# Patient Record
Sex: FEMALE | Race: BLACK OR AFRICAN AMERICAN | Employment: FULL TIME | URBAN - METROPOLITAN AREA
[De-identification: names, ages, dates, MRNs, and addresses within clinical notes are randomized per-mention and may not be internally consistent; named-entity substitution may affect disease eponyms.]

---

## 2017-03-24 ENCOUNTER — HOSPITAL ENCOUNTER (EMERGENCY)
Age: 52
Discharge: HOME | End: 2017-03-24
Attending: EMERGENCY MEDICINE
Payer: COMMERCIAL

## 2017-03-24 ENCOUNTER — APPOINTMENT (OUTPATIENT)
Dept: GENERAL RADIOLOGY | Age: 52
End: 2017-03-24
Attending: NURSE PRACTITIONER
Payer: COMMERCIAL

## 2017-03-24 VITALS
SYSTOLIC BLOOD PRESSURE: 133 MMHG | OXYGEN SATURATION: 99 % | HEIGHT: 58 IN | RESPIRATION RATE: 18 BRPM | BODY MASS INDEX: 23.09 KG/M2 | WEIGHT: 110 LBS | DIASTOLIC BLOOD PRESSURE: 85 MMHG | HEART RATE: 78 BPM
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
## 2017-03-24 DIAGNOSIS — S20.219A STERNAL CONTUSION, INITIAL ENCOUNTER: ICD-10-CM

## 2017-03-24 DIAGNOSIS — S70.11XA CONTUSION OF RIGHT THIGH, INITIAL ENCOUNTER: ICD-10-CM

## 2017-03-24 DIAGNOSIS — S60.221A CONTUSION OF RIGHT HAND, INITIAL ENCOUNTER: ICD-10-CM

## 2017-03-24 PROCEDURE — 99283 EMERGENCY DEPT VISIT LOW MDM: CPT | Performed by: NURSE PRACTITIONER

## 2017-03-24 PROCEDURE — 73552 X-RAY EXAM OF FEMUR 2/>: CPT

## 2017-03-24 PROCEDURE — 71120 X-RAY EXAM BREASTBONE 2/>VWS: CPT

## 2017-03-24 PROCEDURE — 74011250637 HC RX REV CODE- 250/637: Performed by: NURSE PRACTITIONER

## 2017-03-24 RX ORDER — TRAMADOL HYDROCHLORIDE 50 MG/1
50 TABLET ORAL
Status: COMPLETED | OUTPATIENT
Start: 2017-03-24 | End: 2017-03-24

## 2017-03-24 RX ORDER — TRAMADOL HYDROCHLORIDE 50 MG/1
50 TABLET ORAL
Qty: 12 TAB | Refills: 0 | Status: SHIPPED | OUTPATIENT
Start: 2017-03-24 | End: 2017-03-27

## 2017-03-24 RX ADMIN — TRAMADOL HYDROCHLORIDE 50 MG: 50 TABLET, FILM COATED ORAL at 13:55
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
## 2018-12-12 ENCOUNTER — HOSPITAL ENCOUNTER (OUTPATIENT)
Dept: PHYSICAL THERAPY | Age: 53
Discharge: HOME | End: 2018-12-12
Payer: COMMERCIAL

## 2018-12-12 PROCEDURE — 97018 PARAFFIN BATH THERAPY: CPT

## 2018-12-12 PROCEDURE — 97110 THERAPEUTIC EXERCISES: CPT

## 2018-12-12 PROCEDURE — 97165 OT EVAL LOW COMPLEX 30 MIN: CPT

## 2018-12-12 PROCEDURE — 97140 MANUAL THERAPY 1/> REGIONS: CPT

## 2018-12-12 NOTE — THERAPY EVALUATION
Lucía Hamilton  : 1965  Primary: [facility]  [facility]:  [facility] [address], [place], [facility]  [phone]   [phone]         OUTPATIENT OCCUPATIONAL THERAPY: Initial Assessment and Treatment Day: 2018    ICD-10: Treatment Diagnosis: Pain in right wrist (M25.531); Pain in right arm (M79.601); Stiffness of right hand, not elsewhere classified (M25.641); Stiffness of right wrist, not elsewhere classified (M25.631)  Precautions/Allergies:   Patient has no known allergies. Avoid pushing, pulling. No lifting over 2 lbs. MD Orders: Progressive return to work program (progressive hand/UE strengthening and simulating work activities where possible) starting about 14 days post-op. OT 2 X/week X 8 weeks post-op. Instruct patient regarding skin care, scar massage, bathing tips and hourly home active/passive ROM program to maintain/regain finger, thumb wrist, FA, elbow and shoulder function. Encourage light home activities ASAP, and all activities as symptoms as well. Silicone scar treatment prn. MEDICAL/REFERRING DIAGNOSIS:   Carpal tunnel syndrome, right upper limb [G56.01]   DATE OF ONSET:  initial symptoms; s/p surgery  and 2018. REFERRING PHYSICIAN: Hosie Felty, MD  RETURN PHYSICIAN APPOINTMENT: 19     INITIAL ASSESSMENT:  Ms. Ryan Foote presents s/p R carpal tunnel release 18 with decreased functional use, strength, sensation, and range of motion of her right hand, wrist and upper extremity that is affecting her independence with activities of daily living and ability to perform job tasks. I feel that Ms. White will benefit from skilled occupational therapy to maximize the functional use of her upper extremity in daily activities and work tasks. PLAN OF CARE:   PROBLEM LIST:  1. Pain in R wrist/hand.   2. Tingling in R wrist/hand primarily RF/LF and occasionally proximally to elbow.  3. Decreased motion in R wrist/hand/upper extremity. 4. Decreased strength in R wrist/hand/upper extremity. INTERVENTIONS PLANNED:  1. Modalities that may include silicone gel sheets, paraffin, and ultrasound  2. Therapeutic exercise including a home exercise program.  3. Manual therapy. 4. Therapeutic activities. TREATMENT PLAN:  Effective Dates/Frequency/Duration: Twice per week from 12/12/2018 till 2/12/2019 (90 Days). .  GOALS: (Goals have been discussed and agreed upon with patient.)  Short-Term Functional Goals: Time Frame: 4 weeks  1. Decrease pain to 4 to allow patient to perform self care tasks. 2. Increase motion in R finger flexion tips to palm with straight fist to improve functional use of upper extremity in ADL activities. 3. Fold a bandana to simulate work tasks without difficulty to allow patient to  and lift objects during work tasks. 4. Report moderate difficulty or less for carrying a shopping bag via Quick-DASH. Discharge Goals: Time Frame: 8 weeks  1. Decrease pain to 2 to allow patient to perform all household and work tasks. 2. Increase motion in R wrist extension/flexion by 10 degrees to allow patient to perform all ADL activities. 3. Increase strength in R  by 15 pounds to allow patient to , lift, hold, and carry heavy objects. 5. Report moderate or less tingling via Quick-DASH. Rehabilitation Potential For Stated Goals: Good  Regarding [de-identified] M Cindy's therapy, I certify that the treatment plan above will be carried out by a therapist or under their direction. Thank you for this referral,  Bradley Gallagher OTR/L       Referring Physician Signature: Miladis Juarez MD _________________________  Date _________            The information in this section was collected on 12/12/18 (except where otherwise noted).    OCCUPATIONAL PROFILE & HISTORY:   History of Present Injury/Illness (Reason for Referral):  Lori Otero is a  who is s/p R carpal tunnel release on 18 by referring physician. She reports persistent numbness and stiffness in her ring and middle fingers which began about a year ago which is worse when at sleep and when she is gripping. She reports she is having tingling going up to her elbow as well a couple of times a day more when she is laying down. States she is not sleeping very well. States she has a history of R carpal tunnel syndrome in  after a pregnancy. States she is having diffculty opening up things. States she hasn't been doing a lot of cooking because of the pain in her R hand. She states she is keeping her hand over her head, because the doctor told her to keep it elevated. She is wearing a wrist cock up brace on the R at all times. 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 She has a history of trigger thumb with release done on the R in . History of R carpal tunnel and recently left thumb flexor injection. History of L carpal tunnel release (still wears a wrist cock up brace on the L at night) and surgery on radial wrist because \"my wrist kept locking up. \"    Ms. White  has a past medical history of Chronic groin pain, Environmental allergies, Incisional pain, and Sebaceous cyst.  Ms. Nico Mcneal  has a past surgical history that includes hx breast reduction (Bilateral); hx  section; hx myomectomy; hx tonsillectomy; hx dilation and curettage; hx hysterectomy (12); hx bunionectomy (Bilateral); EXPLORATION AND REVISION OF PFANNENSTIEL INCISION (Left, 2014); LAPAROSCOPY GYN DIAGNOSTIC (N/A, 2014); EXCISION OF SEBACEOUS CYST LEFT BUTTOCK (Left, 2014); RIGHT THUMB TRIGGER RELEASE (Right, 12/10/2013); HAND CARPAL TUNNEL RELEASE LEFT (Left, 12/10/2013); DEQUERVAINS RELEASE LEFT (Left, 12/10/2013); and HYSTERECTOMY ABDOMINAL TOTAL WITH SALPINGO OOPHORECTOMY (Bilateral, 2012).   Social History/Living Environment:    Denies smoking. Denies any   Prior Level of Function/Work/Activity:  Lives with son in apartment. 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 \"  Does not   Dominant Side:         RIGHT  Previous Treatment Approaches:          No therapy. Current Medications:    Current Outpatient Medications: per intake form.   fexofenadine (ALLEGRA) 180 mg tablet, Take 180 mg by mouth daily. Date Last Reviewed:  2018    Number of medical conditions (excluding presenting problem) that affect the Plan of Care: Expanded review of therapy/medical records (1-2):  MODERATE COMPLEXITY   ASSESSMENT OF OCCUPATIONAL PERFORMANCE:   RANGE OF MOTION:     · AROM:   R:  Wrist extension: 62°  Wrist flexion: 50°  Finger tips ~1-2 cm from palm with straight fist.   Shoulder ER 80-90° shoulder abducted to 90° (complains of pain with this).   L:   Wrist extension: 69°  Wrist flexion: 65°  Shoulder ER 80-90° shoulder abducted to 90°  · PROM:   R:  Wrist extension: 69° (painful)  Wrist flexion: 52° (painful)  STRENGTH:     R:   Schafer abduction: 4+/5  Opposition: 4+/5  Thumb adduction: 4/5  FPL: 4+/5  Thumb MP flexion: 4+/5  Digit adduction: IF/SF: 4-/5; RF: 4/5; LF: 4+/5  Digit abduction: IF/SF: 4+/5; RF: 4-/5  Shoulder flexion: 4+/5  Shoulder ER: 4/5  Elbow flexion: 5/5  Elbow extension: 5/5  L:   Schafer abduction: 4/5  Opposition: 4/5  Thumb adduction: 4/5  FPL: 4+/5  Thumb MP flexion: 4+/5  Digit adduction: IF/SF: 4/5; RF: 4+/5; LF: 4+/5  Digit abduction: IF/SF: 4+/5; RF: 4+/5; LF: 4+/5  Shoulder flexion: 4+/5  Shoulder ER: 4+/5  Elbow flexion: 5/5  Elbow extension: 5/5   Strength (Dynamometer)      LUE 1st run lbs 2nd run lbs; 3rd run lbs; 4th run 5th rung: 15 lbs     RUE 1st run lbs 2nd run lbs; 3rd run lbs; 4th run lbs 5th run lbs     SENSATION: Trona-Miri Monofilament (D=Dorsally; V=Volarly):  2.83: D: R: Senses over thumb P1/P2. Unable to sense anywhere else. V: Senses 25% of the time over RF/LF P1-3. Able to sense L/R hands elsewhere.; L: 100% excluding thumb P1/P2;   3.61: D: R: Unable to sense RF P1-3 75% of the time. Unable to sense LF P1-3 30-50% of the time. Able to sense elsewhere R/L hands. V: R/L 100%. 4.31: 100%  4.56:100%  6.65: 100%  EDEMA:  No significant swelling noted. OBSERVATION/PALPATION:  Tenderness (most tender radial proximal portion) over volar scar/incision along proximal thenar crease. Reports tingling RF/LF at times during scar massage. SPECIAL TESTS:  Krzysztof Mojica: -; Tinel's: over carpal tunnel: -; over cubital tunnel: +; Mode pick-up R/L: 11/10 seconds; eyes occluded 20 seconds. Upper limb tension test: RUE positive for median nerve     Physical Skills Involved:  1. Range of Motion  2. Strength  3. Activity Tolerance  4. Sensation  5. Skin Integrity Cognitive Skills Affected (resulting in the inability to perform in a timely and safe manner):  1. None noted Psychosocial Skills Affected:  1. Habits/Routines  2. Environmental Adaptation  3. Social Interaction  4. Social Roles   Number of elements that affect the Plan of Care: 1-3:  LOW COMPLEXITY   CLINICAL DECISION MAKING:   Outcome Measure: Tool Used: Disabilities of the Arm, Shoulder and Hand (DASH) Questionnaire - Quick Version  Score:  Initial: 39/55 (ommitted #6) Most Recent: X/55 (Date: -- )   Interpretation of Score: The DASH is designed to measure the activities of daily living in person's with upper extremity dysfunction or pain. Each section is scored on a 1-5 scale, 5 representing the greatest disability. The scores of each section are added together for a total score of 55. Medical Necessity:   · Patient demonstrates good rehab potential due to higher previous functional level.   Reason for Services/Other Comments:  · Patient continues to require skilled intervention due to decreased independence with ADL, instrumental ADL and work tasks. Clinical Decision-Making Assessment:  Teressa White required none to minimal verbal, visual, physical or environmental cues to carry out assessment tasks. Clinical Decision-Making: LOW COMPLEXITY   TREATMENT:   (In addition to Assessment/Re-Assessment sessions the following treatments were rendered)  Pre-treatment Symptoms/Complaints:  Numbness/tingling R RF/LF. Tenderness over incision. Pain: Initial:   Pain Intensity 1: 6  Pain Location 1: Hand  Pain Orientation 1: Right  Post Session:  same   Assessment: 45 minutes  In addition to assessment the below treatment deemed medically necessary. MODALITIES: (5-10 minutes): *  ParaffinTherapy in order to provide analgesia and reduce stiffness R hand/wrist.   Therapeutic Exercise: (  8 minutes):  Exercises per grid below to improve mobility, strength, coordination and dynamic movement of hand - right and wrist - right to improve functional reaching and grasping/releasing. Required moderate visual, verbal, manual and tactile cues to promote proper body alignment, promote proper body posture and promote proper body mechanics. Progressed resistance, range, repetitions and complexity of movement as indicated. Date:  12/12 Date:   Date:     Activity/Exercise Parameters Parameters Parameters   Tendon glides 3 sets 5-10 reps (pain in R shoulder with straight fist)           Manual Therapy: (15 minutes): Soft tissue mobilization was utilized and necessary because of the patient's restricted joint motion and restricted motion of soft tissue. Gentle longitudinal and horizontal scar massage completed with scar massage tool (rubber tip). Patient reported tingling in LF/RF at times. Home program: As above. Treatment/Session Assessment:    · Response to Treatment:  Patients tolerated treatment Well with no medical complications.   Upon completion of treatment, skin condition was intact/without erythema. .  · Compliance with Program/Exercises: compliant today. · Recommendations/Intent for next treatment session: \"Next visit will focus on advancements to more challenging activities\".   Total Treatment Duration:  OT Patient Time In/Time Out  Time In: 1030  Time Out: 100 [facility] MARK Santana, OTR/L

## 2018-12-14 ENCOUNTER — HOSPITAL ENCOUNTER (OUTPATIENT)
Dept: PHYSICAL THERAPY | Age: 53
Discharge: HOME | End: 2018-12-14
Payer: COMMERCIAL

## 2018-12-14 PROCEDURE — 97140 MANUAL THERAPY 1/> REGIONS: CPT

## 2018-12-14 PROCEDURE — 97110 THERAPEUTIC EXERCISES: CPT

## 2018-12-14 PROCEDURE — 97018 PARAFFIN BATH THERAPY: CPT

## 2018-12-14 NOTE — THERAPY EVALUATION
Oumou Garcia  : 1965  Primary: [employer] [facility] [employer] [facility]  Secondary:  [address]  at [facility]  [address], [address], [place] [address] [address] [facility]  [phone]   [phone]         OUTPATIENT OCCUPATIONAL THERAPY: Treatment Day: 2018    ICD-10: Treatment Diagnosis: Pain in right wrist (M25.531); Pain in right arm (M79.601); Stiffness of right hand, not elsewhere classified (M25.641); Stiffness of right wrist, not elsewhere classified (M25.631)  Precautions/Allergies:   Patient has no known allergies. Avoid pushing, pulling. No lifting over 2 lbs. MD Orders: Progressive return to work program (progressive hand/UE strengthening and simulating work activities where possible) starting about 14 days post-op. OT 2 X/week X 8 weeks post-op. Instruct patient regarding skin care, scar massage, bathing tips and hourly home active/passive ROM program to maintain/regain finger, thumb wrist, FA, elbow and shoulder function. Encourage light home activities ASAP, and all activities as symptoms as well. Silicone scar treatment prn. MEDICAL/REFERRING DIAGNOSIS:   Carpal tunnel syndrome, right upper limb [G56.01]   DATE OF ONSET:  initial symptoms; s/p surgery  and 2018. REFERRING PHYSICIAN: Patito Ware MD  RETURN PHYSICIAN APPOINTMENT: 19     INITIAL ASSESSMENT:  Ms. Major Kaminski presents s/p R carpal tunnel release 18 with decreased functional use, strength, sensation, and range of motion of her right hand, wrist and upper extremity that is affecting her independence with activities of daily living and ability to perform job tasks. I feel that Ms. White will benefit from skilled occupational therapy to maximize the functional use of her upper extremity in daily activities and work tasks. PLAN OF CARE:   PROBLEM LIST:  1. Pain in R wrist/hand. 2. Tingling in R wrist/hand primarily RF/LF and occasionally proximally to elbow.   3. Decreased motion in R wrist/hand/upper extremity. 4. Decreased strength in R wrist/hand/upper extremity. INTERVENTIONS PLANNED:  1. Modalities that may include silicone gel sheets, paraffin, and ultrasound  2. Therapeutic exercise including a home exercise program.  3. Manual therapy. 4. Therapeutic activities. TREATMENT PLAN:  Effective Dates/Frequency/Duration: Twice per week from 12/12/2018 till 2/12/2019 (90 Days). .  GOALS: (Goals have been discussed and agreed upon with patient.)  Short-Term Functional Goals: Time Frame: 4 weeks  1. Decrease pain to 4 to allow patient to perform self care tasks. 2. Increase motion in R finger flexion tips to palm with straight fist to improve functional use of upper extremity in ADL activities. 3. Fold a bandana to simulate work tasks without difficulty to allow patient to  and lift objects during work tasks. 4. Report moderate difficulty or less for carrying a shopping bag via Quick-DASH. Discharge Goals: Time Frame: 8 weeks  1. Decrease pain to 2 to allow patient to perform all household and work tasks. 2. Increase motion in R wrist extension/flexion by 10 degrees to allow patient to perform all ADL activities. 3. Increase strength in R  by 15 pounds to allow patient to , lift, hold, and carry heavy objects. 5. Report moderate or less tingling via Quick-DASH. Rehabilitation Potential For Stated Goals: Good  Thank you for this referral,  MARK Dickinson, OTR/L                 The information in this section was collected on 12/12/18 (except where otherwise noted). OCCUPATIONAL PROFILE & HISTORY:   History of Present Injury/Illness (Reason for Referral):  Apryl Medina is a  who is s/p R carpal tunnel release on 11/13/18 by referring physician. She reports persistent numbness and stiffness in her ring and middle fingers which began about a year ago which is worse when at sleep and when she is gripping.   She reports she is having tingling going up to her elbow as well a couple of times a day more when she is laying down. States she is not sleeping very well. States she has a history of R carpal tunnel syndrome in  after a pregnancy. States she is having diffculty opening up things. States she hasn't been doing a lot of cooking because of the pain in her R hand. She states she is keeping her hand over her head, because the doctor told her to keep it elevated. She is wearing a wrist cock up brace on the R at all times. 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 She has a history of trigger thumb with release done on the R in . History of R carpal tunnel and recently left thumb flexor injection. History of L carpal tunnel release (still wears a wrist cock up brace on the L at night) and surgery on radial wrist because \"my wrist kept locking up. \"    Ms. White  has a past medical history of Chronic groin pain, Environmental allergies, Incisional pain, and Sebaceous cyst.  Ms. Job Uriostegui  has a past surgical history that includes hx breast reduction (Bilateral); hx  section; hx myomectomy; hx tonsillectomy; hx dilation and curettage; hx hysterectomy (12); hx bunionectomy (Bilateral); EXPLORATION AND REVISION OF PFANNENSTIEL INCISION (Left, 2014); LAPAROSCOPY GYN DIAGNOSTIC (N/A, 2014); EXCISION OF SEBACEOUS CYST LEFT BUTTOCK (Left, 2014); RIGHT THUMB TRIGGER RELEASE (Right, 12/10/2013); HAND CARPAL TUNNEL RELEASE LEFT (Left, 12/10/2013); DEQUERVAINS RELEASE LEFT (Left, 12/10/2013); and HYSTERECTOMY ABDOMINAL TOTAL WITH SALPINGO OOPHORECTOMY (Bilateral, 2012). Social History/Living Environment:    Denies smoking. Denies any   Prior Level of Function/Work/Activity:  Lives with son in apartment. 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 \"  Does not   Dominant Side:         RIGHT  Previous Treatment Approaches:          No therapy. Current Medications:    Current Outpatient Medications: per intake form.   fexofenadine (ALLEGRA) 180 mg tablet, Take 180 mg by mouth daily. Date Last Reviewed:  2018    Number of medical conditions (excluding presenting problem) that affect the Plan of Care: Expanded review of therapy/medical records (1-2):  MODERATE COMPLEXITY   ASSESSMENT OF OCCUPATIONAL PERFORMANCE:   RANGE OF MOTION:     · AROM:   R:  Wrist extension: 62°  Wrist flexion: 50°  Finger tips ~1-2 cm from palm with straight fist.   Shoulder ER 80-90° shoulder abducted to 90° (complains of pain with this). L:   Wrist extension: 69°  Wrist flexion: 65°  Shoulder ER 80-90° shoulder abducted to 90°  · PROM:   R:  Wrist extension: 69° (painful)  Wrist flexion: 52° (painful)  STRENGTH:     R:   Schafer abduction: 4+/5  Opposition: 4+/5  Thumb adduction: 4/5  FPL: 4+/5  Thumb MP flexion: 4+/5  Digit adduction: IF/SF: 4-/5; RF: 4/5; LF: 4+/5  Digit abduction: IF/SF: 4+/5; RF: 4-/5  Shoulder flexion: 4+/5  Shoulder ER: 4/5  Elbow flexion: 5/5  Elbow extension: 5/5  L:   Schafer abduction: 4/5  Opposition: 4/5  Thumb adduction: 4/5  FPL: 4+/5  Thumb MP flexion: 4+/5  Digit adduction: IF/SF: 4/5; RF: 4+/5; LF: 4+/5  Digit abduction: IF/SF: 4+/5; RF: 4+/5; LF: 4+/5  Shoulder flexion: 4+/5  Shoulder ER: 4+/5  Elbow flexion: 5/5  Elbow extension: 5/5   Strength (Dynamometer)      LUE 1st run lbs 2nd run lbs; 3rd run lbs; 4th run 5th rung: 15 lbs     RUE 1st run lbs 2nd run lbs; 3rd run lbs; 4th run lbs 5th run lbs     SENSATION:    [place] Monofilament (D=Dorsally; V=Volarly):  2.83: D: R: Senses over thumb P1/P2. Unable to sense anywhere else. V: Senses 25% of the time over RF/LF P1-3.  Able to sense L/R hands elsewhere.; L: 100% excluding thumb P1/P2;   3.61: D: R: Unable to sense RF P1-3 75% of the time. Unable to sense LF P1-3 30-50% of the time. Able to sense elsewhere R/L hands. V: R/L 100%. 4.31: 100%  4.56:100%  6.65: 100%  EDEMA:  No significant swelling noted. OBSERVATION/PALPATION:  Tenderness (most tender radial proximal portion) over volar scar/incision along proximal thenar crease. Reports tingling RF/LF at times during scar massage. SPECIAL TESTS:  Darrel Yaya: -; Tinel's: over carpal tunnel: -; over cubital tunnel: +; Mode pick-up R/L: 11/10 seconds; eyes occluded 20 seconds. Upper limb tension test: RUE positive for median nerve     Physical Skills Involved:  1. Range of Motion  2. Strength  3. Activity Tolerance  4. Sensation  5. Skin Integrity Cognitive Skills Affected (resulting in the inability to perform in a timely and safe manner):  1. None noted Psychosocial Skills Affected:  1. Habits/Routines  2. Environmental Adaptation  3. Social Interaction  4. Social Roles   Number of elements that affect the Plan of Care: 1-3:  LOW COMPLEXITY   CLINICAL DECISION MAKING:   Outcome Measure: Tool Used: Disabilities of the Arm, Shoulder and Hand (DASH) Questionnaire - Quick Version  Score:  Initial: 39/55 (ommitted #6) Most Recent: X/55 (Date: -- )   Interpretation of Score: The DASH is designed to measure the activities of daily living in person's with upper extremity dysfunction or pain. Each section is scored on a 1-5 scale, 5 representing the greatest disability. The scores of each section are added together for a total score of 55. Medical Necessity:   · Patient demonstrates good rehab potential due to higher previous functional level. Reason for Services/Other Comments:  · Patient continues to require skilled intervention due to decreased independence with ADL, instrumental ADL and work tasks. Clinical Decision-Making Assessment:  Venita White required none to minimal verbal, visual, physical or environmental cues to carry out assessment tasks.    Clinical Decision-Making: LOW COMPLEXITY   TREATMENT:   (In addition to Assessment/Re-Assessment sessions the following treatments were rendered)  Pre-treatment Symptoms/Complaints:  Numbness/tingling R RF/LF. Reports no tingling going up her elbow since session. Reports less tenderness over incision. Pain: Initial:   Pain Intensity 1: 6  Pain Location 1: Hand  Pain Orientation 1: Right  Post Session:  same     MODALITIES: (5-10 minutes): *  ParaffinTherapy in order to provide analgesia and reduce stiffness R hand/wrist.   Therapeutic Exercise: (  25 minutes):  Exercises per grid below to improve mobility, strength, coordination and dynamic movement of hand - right and wrist - right to improve functional reaching and grasping/releasing. Required moderate visual, verbal, manual and tactile cues to promote proper body alignment, promote proper body posture and promote proper body mechanics. Progressed resistance, range, repetitions and complexity of movement as indicated. Date:  12/12 Date:  12/14 Date:     Activity/Exercise Parameters Parameters Parameters   Tendon glides 3 sets 5-10 reps (pain in R shoulder with straight fist) 1. 3 sets 5-10 reps (mild pain in R shoulder with straight fist). 2. Composite fist with wrist extension 5 reps. Long finger extensor stretch  1. Composite flexion assist from therapist 2-3 sets held 2-3 minutes each. Long finger extensor stretch  1. Against base of ramp 2-3 sets held 30-60 seconds. 2. Prayer  Stretch 2-3 sets held 15-30 seconds. Individual finger extension  In full wrist extension off of back of ramp 4-5 reps. Intrinsic strengthening  1. Digit adduction tan theraputty 2 sets 5-10 reps. 2. Digit abduction rubberband resistance 5-10 reps. Therapeutic Activity: (    1 minute): Therapeutic activities including Carry objects and manipulate objects to improve mobility and coordination.   Required minimal   to promote coordination of right, upper extremity(s). Patient folded a scarf several times to simulate work tasks without significant difficulty. Manual Therapy: (13 minutes): Soft tissue mobilization was utilized and necessary because of the patient's restricted joint motion and restricted motion of soft tissue. Gentle longitudinal and horizontal scar massage completed with scar massage tool (rubber tip). Patient reported tingling in LF/RF at times. Home program: As above. Treatment/Session Assessment:  Emilia White with improved scar turgor and able to touch LF/RF to [facility] in straight fist toward end of session. We reviewed tendon glides and she was instructed in gentle long finger flexor/extensor stretches. Able to fold a scarf to simulate work tasks. Continue OT per plan of care. · Response to Treatment:  Patients tolerated treatment Well with no medical complications. Upon completion of treatment, skin condition was intact/without erythema. .  · Compliance with Program/Exercises: compliant today. · Recommendations/Intent for next treatment session: \"Next visit will focus on advancements to more challenging activities\".   Total Treatment Duration:  OT Patient Time In/Time Out  Time In: 1030  Time Out: MARK Bernal, OTR/L

## 2018-12-17 ENCOUNTER — HOSPITAL ENCOUNTER (OUTPATIENT)
Dept: PHYSICAL THERAPY | Age: 53
Discharge: HOME | End: 2018-12-17
Payer: COMMERCIAL

## 2018-12-17 PROCEDURE — 97110 THERAPEUTIC EXERCISES: CPT

## 2018-12-17 PROCEDURE — 97018 PARAFFIN BATH THERAPY: CPT

## 2018-12-17 PROCEDURE — 97140 MANUAL THERAPY 1/> REGIONS: CPT

## 2018-12-17 NOTE — PROGRESS NOTES
Payal Thomas  : 1965  Primary: [employer]  Secondary:  [address] at [facility]  [address] [facility], [address] [place] [address], [place], [facility]  [phone]   [phone]         OUTPATIENT OCCUPATIONAL THERAPY: Treatment Day: 2018    ICD-10: Treatment Diagnosis: Pain in right wrist (M25.531); Pain in right arm (M79.601); Stiffness of right hand, not elsewhere classified (M25.641); Stiffness of right wrist, not elsewhere classified (M25.631)  Precautions/Allergies:   Patient has no known allergies. Avoid pushing, pulling. No lifting over 2 lbs. MD Orders: Progressive return to work program (progressive hand/UE strengthening and simulating work activities where possible) starting about 14 days post-op. OT 2 X/week X 8 weeks post-op. Instruct patient regarding skin care, scar massage, bathing tips and hourly home active/passive ROM program to maintain/regain finger, thumb wrist, FA, elbow and shoulder function. Encourage light home activities ASAP, and all activities as symptoms as well. Silicone scar treatment prn. MEDICAL/REFERRING DIAGNOSIS:   Carpal tunnel syndrome, right upper limb [G56.01]   DATE OF ONSET:  initial symptoms; s/p surgery  and 2018. REFERRING PHYSICIAN: Philippe Forrester MD  RETURN PHYSICIAN APPOINTMENT: 19     INITIAL ASSESSMENT:  Ms. Maggy Saunders presents s/p R carpal tunnel release 18 with decreased functional use, strength, sensation, and range of motion of her right hand, wrist and upper extremity that is affecting her independence with activities of daily living and ability to perform job tasks. I feel that Ms. White will benefit from skilled occupational therapy to maximize the functional use of her upper extremity in daily activities and work tasks. PLAN OF CARE:   PROBLEM LIST:  1. Pain in R wrist/hand. 2. Tingling in R wrist/hand primarily RF/LF and occasionally proximally to elbow.   3. Decreased motion in R wrist/hand/upper extremity. 4. Decreased strength in R wrist/hand/upper extremity. INTERVENTIONS PLANNED:  1. Modalities that may include silicone gel sheets, paraffin, and ultrasound  2. Therapeutic exercise including a home exercise program.  3. Manual therapy. 4. Therapeutic activities. TREATMENT PLAN:  Effective Dates/Frequency/Duration: Twice per week from 12/12/2018 till 2/12/2019 (90 Days). .  GOALS: (Goals have been discussed and agreed upon with patient.)  Short-Term Functional Goals: Time Frame: 4 weeks  1. Decrease pain to 4 to allow patient to perform self care tasks. Met.   2. Increase motion in R finger flexion tips to palm with straight fist to improve functional use of upper extremity in ADL activities. 3. Fold a bandana to simulate work tasks without difficulty to allow patient to  and lift objects during work tasks. 4. Report moderate difficulty or less for carrying a shopping bag via Quick-DASH. Discharge Goals: Time Frame: 8 weeks  1. Decrease pain to 2 to allow patient to perform all household and work tasks. Met.   2. Increase motion in R wrist extension/flexion by 10 degrees to allow patient to perform all ADL activities. Met.   3. Increase strength in R  by 15 pounds to allow patient to , lift, hold, and carry heavy objects. 5. Report moderate or less tingling via Quick-DASH. Rehabilitation Potential For Stated Goals: Good  Thank you for this referral,  Evelyn Poe, MARK, OTR/L                 The information in this section was collected on 12/12/18 (except where otherwise noted). OCCUPATIONAL PROFILE & HISTORY:   History of Present Injury/Illness (Reason for Referral):  Jeffrey Zepeda is a  who is s/p R carpal tunnel release on 11/13/18 by referring physician. She reports persistent numbness and stiffness in her ring and middle fingers which began about a year ago which is worse when at sleep and when she is gripping.   She reports she is having tingling going up to her elbow as well a couple of times a day more when she is laying down. States she is not sleeping very well. States she has a history of R carpal tunnel syndrome in  after a pregnancy. States she is having diffculty opening up things. States she hasn't been doing a lot of cooking because of the pain in her R hand. She states she is keeping her hand over her head, because the doctor told her to keep it elevated. She is wearing a wrist cock up brace on the R at all times. 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 She has a history of trigger thumb with release done on the R in . History of R carpal tunnel and recently left thumb flexor injection. History of L carpal tunnel release (still wears a wrist cock up brace on the L at night) and surgery on radial wrist because \"my wrist kept locking up. \"    Ms. White  has a past medical history of Chronic groin pain, Environmental allergies, Incisional pain, and Sebaceous cyst.  Ms. Leighann Bailey  has a past surgical history that includes hx breast reduction (Bilateral); hx  section; hx myomectomy; hx tonsillectomy; hx dilation and curettage; hx hysterectomy (12); hx bunionectomy (Bilateral); EXPLORATION AND REVISION OF PFANNENSTIEL INCISION (Left, 2014); LAPAROSCOPY GYN DIAGNOSTIC (N/A, 2014); EXCISION OF SEBACEOUS CYST LEFT BUTTOCK (Left, 2014); RIGHT THUMB TRIGGER RELEASE (Right, 12/10/2013); HAND CARPAL TUNNEL RELEASE LEFT (Left, 12/10/2013); DEQUERVAINS RELEASE LEFT (Left, 12/10/2013); and HYSTERECTOMY ABDOMINAL TOTAL WITH SALPINGO OOPHORECTOMY (Bilateral, 2012). Social History/Living Environment:    Denies smoking. Denies any   Prior Level of Function/Work/Activity:  Lives with son in apartment. 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 \"  Does not   Dominant Side:         RIGHT  Previous Treatment Approaches:          No therapy. Current Medications:    Current Outpatient Medications: per intake form.   fexofenadine (ALLEGRA) 180 mg tablet, Take 180 mg by mouth daily. Date Last Reviewed:  2018    Number of medical conditions (excluding presenting problem) that affect the Plan of Care: Expanded review of therapy/medical records (1-2):  MODERATE COMPLEXITY   ASSESSMENT OF OCCUPATIONAL PERFORMANCE:   RANGE OF MOTION:     · AROM:   R:  Wrist extension: 70° versus 62°  Wrist flexion: 70°  50°  Finger tips to palm versus ~1-2 cm from palm with straight fist.   Shoulder ER 80-90° shoulder abducted to 90° (complains of pain with this). L:   Wrist extension: 69°  Wrist flexion: 65°  Shoulder ER 80-90° shoulder abducted to 90°  · PROM:   R:  Wrist extension: 69° (painful)  Wrist flexion: 52° (painful)  STRENGTH:     R:   Schafer abduction: 4+/5  Opposition: 4+/5  Thumb adduction: 4/5  FPL: 4+/5  Thumb MP flexion: 4+/5  Digit adduction: IF/SF: 4-/5; RF: 4/5; LF: 4+/5  Digit abduction: IF/SF: 4+/5; RF: 4-/5  Shoulder flexion: 4+/5  Shoulder ER: 4/5  Elbow flexion: 5/5  Elbow extension: 5/5  L:   Schafer abduction: 4/5  Opposition: 4/5  Thumb adduction: 4/5  FPL: 4+/5  Thumb MP flexion: 4+/5  Digit adduction: IF/SF: 4/5; RF: 4+/5; LF: 4+/5  Digit abduction: IF/SF: 4+/5; RF: 4+/5; LF: 4+/5  Shoulder flexion: 4+/5  Shoulder ER: 4+/5  Elbow flexion: 5/5  Elbow extension: 5/5   Strength (Dynamometer)      LUE 1st run lbs 2nd run lbs; 3rd run lbs; 4th run 5th rung: 15 lbs     RUE 1st run lbs 2nd run lbs; 3rd run lbs; 4th run lbs 5th run lbs     SENSATION:    [place] Monofilament (D=[place]; V=Volarly):  2.83: D: R: Senses over thumb P1/P2. Unable to sense anywhere else. V: Senses 25% of the time over RF/LF P1-3. Able to sense L/R hands elsewhere. ; L: 100% excluding thumb P1/P2;   3.61: D: R: Unable to sense RF P1-3 75% of the time. Unable to sense LF P1-3 30-50% of the time. Able to sense elsewhere R/L hands. V: R/L 100%. 4.31: 100%  4.56:100%  6.65: 100%  EDEMA:  No significant swelling noted. OBSERVATION/PALPATION:  Tenderness (most tender radial proximal portion) over volar scar/incision along proximal thenar crease. Reports tingling RF/LF at times during scar massage. SPECIAL TESTS:  Vania Cerna: -; Tinel's: over carpal tunnel: -; over cubital tunnel: +; Mode pick-up R/L: 11/10 seconds; eyes occluded 20 seconds. Upper limb tension test: RUE positive for median nerve     Physical Skills Involved:  1. Range of Motion  2. Strength  3. Activity Tolerance  4. Sensation  5. Skin Integrity Cognitive Skills Affected (resulting in the inability to perform in a timely and safe manner):  1. None noted Psychosocial Skills Affected:  1. Habits/Routines  2. Environmental Adaptation  3. Social Interaction  4. Social Roles   Number of elements that affect the Plan of Care: 1-3:  LOW COMPLEXITY   CLINICAL DECISION MAKING:   Outcome Measure: Tool Used: Disabilities of the Arm, Shoulder and Hand (DASH) Questionnaire - Quick Version  Score:  Initial: 39/55 (ommitted #6) Most Recent: X/55 (Date: -- )   Interpretation of Score: The DASH is designed to measure the activities of daily living in person's with upper extremity dysfunction or pain. Each section is scored on a 1-5 scale, 5 representing the greatest disability. The scores of each section are added together for a total score of 55. Medical Necessity:   · Patient demonstrates good rehab potential due to higher previous functional level. Reason for Services/Other Comments:  · Patient continues to require skilled intervention due to decreased independence with ADL, instrumental ADL and work tasks.   Clinical Decision-Making Assessment:  Cande White required none to minimal verbal, visual, physical or environmental cues to carry out assessment tasks. Clinical Decision-Making: LOW COMPLEXITY   TREATMENT:   (In addition to Assessment/Re-Assessment sessions the following treatments were rendered)  Pre-treatment Symptoms/Complaints:  Numbness/tingling R RF/LF. Reports no tingling going up her elbow over the weekend. Pain: Initial:   Pain Intensity 1: 0  Pain Location 1: Hand  Pain Orientation 1: Right  Post Session:  same     MODALITIES: (5-10 minutes): *  ParaffinTherapy in order to provide analgesia and reduce stiffness R hand/wrist.   Therapeutic Exercise: (  25 minutes):  Exercises per grid below to improve mobility, strength, coordination and dynamic movement of hand - right and wrist - right to improve functional reaching and grasping/releasing. Required moderate visual, verbal, manual and tactile cues to promote proper body alignment, promote proper body posture and promote proper body mechanics. Progressed resistance, range, repetitions and complexity of movement as indicated. Date:  12/12 Date:  12/14 Date:  12/17   Activity/Exercise Parameters Parameters Parameters   Tendon glides 3 sets 5-10 reps (pain in R shoulder with straight fist) 1. 3 sets 5-10 reps (mild pain in R shoulder with straight fist). 2. Composite fist with wrist extension 5 reps. 1-2 sets 5-10 reps. Long finger extensor stretch  1. Composite flexion assist from therapist 2-3 sets held 2-3 minutes each. Composite flexion assist from therapist 1-2 sets held 2-3 minutes each. Long finger extensor stretch  1. Against base of ramp 2-3 sets held 30-60 seconds. 2. Prayer  Stretch 2-3 sets held 15-30 seconds. 1. Against base of ramp 2-3 sets held 30-60 seconds. 2. Prayer  Stretch 1-2 sets held 15-30 seconds. Individual finger extension  In full wrist extension off of back of ramp 4-5 reps. In full wrist extension off of back of ramp 4-5 reps. Intrinsic strengthening  1. Digit adduction tan theraputty 2 sets 5-10 reps. 2. Digit abduction rubberband resistance 5-10 reps. 1. Digit adduction tan theraputty 1-2 sets 5-10 reps. 2. Digit abduction rubberband resistance 1-2 sets 5-10 reps   Thumb cordova abduction   1. 5-10 reps AROM. 2. 5-10 reps with rubber band. Shoulder external rotation   Shoulders adducted A: 4-5 reps yellow theraband (reported some dull pain in R shoulder with this). B: 5-10 reps AROM. Median nerve glides   1. Shoulder adducted 3 sets 3-4 reps. 2. Shoulder abducted to 90° A: elbow extended 1-2 sets 4-5 reps. B: Elbow flex/extend then laterally flex neck L 1-2 sets 2-3 reps. C:  Also completed upper limb tension for for median nerve. Therapeutic Activity: (    0 minute): Therapeutic activities including Carry objects and manipulate objects to improve mobility and coordination. Required minimal   to promote coordination of right, upper extremity(s). Patient folded a scarf several times to simulate work tasks without significant difficulty. Manual Therapy: (13 minutes): Soft tissue mobilization was utilized and necessary because of the patient's restricted joint motion and restricted motion of soft tissue. Gentle longitudinal and horizontal scar massage completed with scar massage tool (rubber tip). Patient reported no tingling in LF/RF this time. Home program: As above. Treatment/Session Assessment:  Isaiah White was able to complete straight fist finger pads to distal cordova crease and extends/flexes wrist to 70° at beginning of session today. She is reporting no pain at start of session, but some R shoulder pain/discomfort with specific postures. Added nerve glides to address. Continue OT per plan of care. · Response to Treatment:  Patients tolerated treatment Well with no medical complications. Upon completion of treatment, skin condition was intact/without erythema. .  · Compliance with Program/Exercises: compliant today. · Recommendations/Intent for next treatment session:  \"Next visit will focus on advancements to more challenging activities\".   Total Treatment Duration:  OT Patient Time In/Time Out  Time In: 1315  Time Out: MARK Vasquez, OTR/L

## 2018-12-20 ENCOUNTER — HOSPITAL ENCOUNTER (OUTPATIENT)
Dept: PHYSICAL THERAPY | Age: 53
Discharge: HOME | End: 2018-12-20
Payer: COMMERCIAL

## 2018-12-20 PROCEDURE — 97140 MANUAL THERAPY 1/> REGIONS: CPT

## 2018-12-20 PROCEDURE — 97110 THERAPEUTIC EXERCISES: CPT

## 2018-12-20 NOTE — PROGRESS NOTES
Amaury Rogers  : 1965  Primary: [employer]  Secondary:  [address]  at [facility]  [address], [address], [place], [facility]  [phone]   [phone]         OUTPATIENT OCCUPATIONAL THERAPY: Treatment Day: 2018    ICD-10: Treatment Diagnosis: Pain in right wrist (M25.531); Pain in right arm (M79.601); Stiffness of right hand, not elsewhere classified (M25.641); Stiffness of right wrist, not elsewhere classified (M25.631)  Precautions/Allergies:   Patient has no known allergies. Avoid pushing, pulling. No lifting over 2 lbs. MD Orders: Progressive return to work program (progressive hand/UE strengthening and simulating work activities where possible) starting about 14 days post-op. OT 2 X/week X 8 weeks post-op. Instruct patient regarding skin care, scar massage, bathing tips and hourly home active/passive ROM program to maintain/regain finger, thumb wrist, FA, elbow and shoulder function. Encourage light home activities ASAP, and all activities as symptoms as well. Silicone scar treatment prn. MEDICAL/REFERRING DIAGNOSIS:   Carpal tunnel syndrome, right upper limb [G56.01]   DATE OF ONSET:  initial symptoms; s/p surgery  and 2018. REFERRING PHYSICIAN: Jose Alston MD  RETURN PHYSICIAN APPOINTMENT: 19     INITIAL ASSESSMENT:  Ms. Keke Cross presents s/p R carpal tunnel release 18 with decreased functional use, strength, sensation, and range of motion of her right hand, wrist and upper extremity that is affecting her independence with activities of daily living and ability to perform job tasks. I feel that Ms. White will benefit from skilled occupational therapy to maximize the functional use of her upper extremity in daily activities and work tasks. PLAN OF CARE:   PROBLEM LIST:  1. Pain in R wrist/hand. 2. Tingling in R wrist/hand primarily RF/LF and occasionally proximally to elbow.   3. Decreased motion in R wrist/hand/upper extremity. 4. Decreased strength in R wrist/hand/upper extremity. INTERVENTIONS PLANNED:  1. Modalities that may include silicone gel sheets, paraffin, and ultrasound  2. Therapeutic exercise including a home exercise program.  3. Manual therapy. 4. Therapeutic activities. TREATMENT PLAN:  Effective Dates/Frequency/Duration: Twice per week from 12/12/2018 till 2/12/2019 (90 Days). .  GOALS: (Goals have been discussed and agreed upon with patient.)  Short-Term Functional Goals: Time Frame: 4 weeks  1. Decrease pain to 4 to allow patient to perform self care tasks. Met.   2. Increase motion in R finger flexion tips to palm with straight fist to improve functional use of upper extremity in ADL activities. 3. Fold a bandana to simulate work tasks without difficulty to allow patient to  and lift objects during work tasks. 4. Report moderate difficulty or less for carrying a shopping bag via Quick-DASH. Discharge Goals: Time Frame: 8 weeks  1. Decrease pain to 2 to allow patient to perform all household and work tasks. Met.   2. Increase motion in R wrist extension/flexion by 10 degrees to allow patient to perform all ADL activities. Met.   3. Increase strength in R  by 15 pounds to allow patient to , lift, hold, and carry heavy objects. 5. Report moderate or less tingling via Quick-DASH. Rehabilitation Potential For Stated Goals: Good  Thank you for this referral,  MARK Alejandra, OTR/L                 The information in this section was collected on 12/12/18 (except where otherwise noted). OCCUPATIONAL PROFILE & HISTORY:   History of Present Injury/Illness (Reason for Referral):  Viri Brito is a  who is s/p R carpal tunnel release on 11/13/18 by referring physician. She reports persistent numbness and stiffness in her ring and middle fingers which began about a year ago which is worse when at sleep and when she is gripping.   She reports she is having tingling going up to her elbow as well a couple of times a day more when she is laying down. States she is not sleeping very well. States she has a history of R carpal tunnel syndrome in  after a pregnancy. States she is having diffculty opening up things. States she hasn't been doing a lot of cooking because of the pain in her R hand. She states she is keeping her hand over her head, because the doctor told her to keep it elevated. She is wearing a wrist cock up brace on the R at all times. 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 She has a history of trigger thumb with release done on the R in . History of R carpal tunnel and recently left thumb flexor injection. History of L carpal tunnel release (still wears a wrist cock up brace on the L at night) and surgery on radial wrist because \"my wrist kept locking up. \"    Ms. White  has a past medical history of Chronic groin pain, Environmental allergies, Incisional pain, and Sebaceous cyst.  Ms. Maggy Saunders  has a past surgical history that includes hx breast reduction (Bilateral); hx  section; hx myomectomy; hx tonsillectomy; hx dilation and curettage; hx hysterectomy (12); hx bunionectomy (Bilateral); EXPLORATION AND REVISION OF PFANNENSTIEL INCISION (Left, 2014); LAPAROSCOPY GYN DIAGNOSTIC (N/A, 2014); EXCISION OF SEBACEOUS CYST LEFT BUTTOCK (Left, 2014); RIGHT THUMB TRIGGER RELEASE (Right, 12/10/2013); HAND CARPAL TUNNEL RELEASE LEFT (Left, 12/10/2013); DEQUERVAINS RELEASE LEFT (Left, 12/10/2013); and HYSTERECTOMY ABDOMINAL TOTAL WITH SALPINGO OOPHORECTOMY (Bilateral, 2012). Social History/Living Environment:    Denies smoking. Denies any   Prior Level of Function/Work/Activity:  Lives with son in apartment. 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 \"  Does not   Dominant Side:         RIGHT  Previous Treatment Approaches:          No therapy. Current Medications:    Current Outpatient Medications: per intake form.   fexofenadine (ALLEGRA) 180 mg tablet, Take 180 mg by mouth daily. Date Last Reviewed:  2018    Number of medical conditions (excluding presenting problem) that affect the Plan of Care: Expanded review of therapy/medical records (1-2):  MODERATE COMPLEXITY   ASSESSMENT OF OCCUPATIONAL PERFORMANCE:   RANGE OF MOTION:     · AROM:   R:  Wrist extension: 70° versus 62°  Wrist flexion: 70°  versus 50°  Finger tips to palm versus ~1-2 cm from palm with straight fist.   Shoulder ER 80-90° shoulder abducted to 90° (complains of pain with this). L:   Wrist extension: 69°  Wrist flexion: 65°  Shoulder ER 80-90° shoulder abducted to 90°  · PROM:   R:  Wrist extension: 69° (painful)  Wrist flexion: 52° (painful)  STRENGTH:     R:   Schafer abduction: 4+/5  Opposition: 4+/5  Thumb adduction: 4/5  FPL: 4+/5  Thumb MP flexion: 4+/5  Digit adduction: IF/SF: 4-/5; RF: 4/5; LF: 4+/5  Digit abduction: IF/SF: 4+/5; RF: 4-/5  Shoulder flexion: 4+/5  Shoulder ER: 4/5  Elbow flexion: 5/5  Elbow extension: 5/5  L:   Schafer abduction: 4/5  Opposition: 4/5  Thumb adduction: 4/5  FPL: 4+/5  Thumb MP flexion: 4+/5  Digit adduction: IF/SF: 4/5; RF: 4+/5; LF: 4+/5  Digit abduction: IF/SF: 4+/5; RF: 4+/5; LF: 4+/5  Shoulder flexion: 4+/5  Shoulder ER: 4+/5  Elbow flexion: 5/5  Elbow extension: 5/5   Strength (Dynamometer)      LUE 1st run lbs 2nd run lbs; 3rd run lbs; 4th run 5th rung: 15 lbs     RUE 1st run lbs 2nd run lbs; 3rd run lbs; 4th run lbs 5th run lbs     SENSATION:    [place] Monofilament (D=[place]; V=Volarly):  2.83: D: R: Senses over thumb P1/P2. Unable to sense anywhere else. V: Senses 25% of the time over RF/LF P1-3.  Able to sense L/R hands elsewhere.; L: 100% excluding thumb P1/P2;   3.61: D: R: Unable to sense RF P1-3 75% of the time. Unable to sense LF P1-3 30-50% of the time. Able to sense elsewhere R/L hands. V: R/L 100%. 4.31: 100%  4.56:100%  6.65: 100%  EDEMA:  No significant swelling noted. OBSERVATION/PALPATION:  Tenderness (most tender radial proximal portion) over volar scar/incision along proximal thenar crease. Reports tingling RF/LF at times during scar massage. SPECIAL TESTS:  Freada Gavel: -; Tinel's: over carpal tunnel: -; over cubital tunnel: +; Mode pick-up R/L: 11/10 seconds; eyes occluded 20 seconds. Upper limb tension test: RUE positive for median nerve     Physical Skills Involved:  1. Range of Motion  2. Strength  3. Activity Tolerance  4. Sensation  5. Skin Integrity Cognitive Skills Affected (resulting in the inability to perform in a timely and safe manner):  1. None noted Psychosocial Skills Affected:  1. Habits/Routines  2. Environmental Adaptation  3. Social Interaction  4. Social Roles   Number of elements that affect the Plan of Care: 1-3:  LOW COMPLEXITY   CLINICAL DECISION MAKING:   Outcome Measure: Tool Used: Disabilities of the Arm, Shoulder and Hand (DASH) Questionnaire - Quick Version  Score:  Initial: 39/55 (ommitted #6) Most Recent: X/55 (Date: -- )   Interpretation of Score: The DASH is designed to measure the activities of daily living in person's with upper extremity dysfunction or pain. Each section is scored on a 1-5 scale, 5 representing the greatest disability. The scores of each section are added together for a total score of 55. Medical Necessity:   · Patient demonstrates good rehab potential due to higher previous functional level. Reason for Services/Other Comments:  · Patient continues to require skilled intervention due to decreased independence with ADL, instrumental ADL and work tasks.   Clinical Decision-Making Assessment:  Liu White required none to minimal verbal, visual, physical or environmental cues to carry out assessment tasks. Clinical Decision-Making: LOW COMPLEXITY   TREATMENT:   (In addition to Assessment/Re-Assessment sessions the following treatments were rendered)  Pre-treatment Symptoms/Complaints:  Numbness/tingling R RF/LF. Reports no tingling going up her elbow over the weekend. Pain: Initial:   Pain Intensity 1: 6  Pain Location 1: Hand, Shoulder  Pain Orientation 1: Right  Post Session:  2/10     MODALITIES: (5-10 minutes): *  Hot Pack Therapy in order to provide analgesia and reduce stiffness R hand/wrist and R shoulder. Therapeutic Exercise: (  25 minutes):  Exercises per grid below to improve mobility, strength, coordination and dynamic movement of hand - right and wrist - right to improve functional reaching and grasping/releasing. Required moderate visual, verbal, manual and tactile cues to promote proper body alignment, promote proper body posture and promote proper body mechanics. Progressed resistance, range, repetitions and complexity of movement as indicated. Date:  12/12 Date:  12/14 Date:  12/17 Date:  12/20   Activity/Exercise Parameters Parameters Parameters Parameters   Tendon glides 3 sets 5-10 reps (pain in R shoulder with straight fist) 1. 3 sets 5-10 reps (mild pain in R shoulder with straight fist). 2. Composite fist with wrist extension 5 reps. 1-2 sets 5-10 reps. 1-2 sets 5-10 reps. Long finger extensor stretch  1. Composite flexion assist from therapist 2-3 sets held 2-3 minutes each. Composite flexion assist from therapist 1-2 sets held 2-3 minutes each. Composite flexion assist from therapist 1-2 sets held 2-3 minutes each. Long finger extensor stretch  1. Against base of ramp 2-3 sets held 30-60 seconds. 2. Prayer  Stretch 2-3 sets held 15-30 seconds. 1. Against base of ramp 2-3 sets held 30-60 seconds. 2. Prayer  Stretch 1-2 sets held 15-30 seconds. Against base of ramp 2-3 sets held 2-3 minutes.    Individual finger extension  In full wrist extension off of back of ramp 4-5 reps. In full wrist extension off of back of ramp 4-5 reps. Individual finger extension off of back of ramp 4-5 reps each finger (RF required AAROM - holding isometrically 2-3 reps)   Intrinsic strengthening  1. Digit adduction tan theraputty 2 sets 5-10 reps. 2. Digit abduction rubberband resistance 5-10 reps. 1. Digit adduction tan theraputty 1-2 sets 5-10 reps. 2. Digit abduction rubberband resistance 1-2 sets 5-10 reps RF adduction 5-10 reps AROM   Wrist extension/flexion    10-15 reps 1 lb dumbbell each. Wrist radial/ulnar deviation    10-15 reps 1 lb dumbbell   Thumb cordova abduction   1. 5-10 reps AROM. 2. 5-10 reps with rubber band. Shoulder external rotation   Shoulders adducted A: 4-5 reps yellow theraband (reported some dull pain in R shoulder with this). B: 5-10 reps AROM. Median nerve glides   1. Shoulder adducted 3 sets 3-4 reps. 2. Shoulder abducted to 90° A: elbow extended 1-2 sets 4-5 reps. B: Elbow flex/extend then laterally flex neck L 1-2 sets 2-3 reps. C:  Also completed upper limb tension for for median nerve. 1. Shoulder adducted 3 sets 3-4 reps. 2. Shoulder abducted to 90° A: elbow extended 1-2 sets 4-5 reps. Therapeutic Activity: (    0 minute): Therapeutic activities including Carry objects and manipulate objects to improve mobility and coordination. Required minimal   to promote coordination of right, upper extremity(s). Patient folded a scarf several times to simulate work tasks without significant difficulty. Manual Therapy: (15 minutes): Soft tissue mobilization was utilized and necessary because of the patient's restricted joint motion and restricted motion of soft tissue. Gentle longitudinal and horizontal scar massage completed with scar massage tool (rubber tip). Patient reported mild tingling in LF/RF at times.   Also, placed a strip of 1.5 cm X 3 cm transpore paper tape along scar with mild-moderate tension. Test strip did not show erythema and she denies allergy. Lastly, gentle pétrissage/effleurage completed over upper trapezius. Patient reported decreased tenderness/shoulder pain following (2/10 versus 6/10). Home program: As above. Treatment/Session Assessment:  Jessica White reports increased pain from last visit 2 days ago (thinks it's the rain) in R shoulder/hand. Still able to touch finger tips to palm with straight fist.  She reported 2/10 pain after manual therapy. Opted for paper tape as silicone strips are currently unavailable. .  Continue OT per plan of care. · Response to Treatment:  Patients tolerated treatment Well with no medical complications. Upon completion of treatment, skin condition was intact/without erythema. .  · Compliance with Program/Exercises: compliant today. · Recommendations/Intent for next treatment session: \"Next visit will focus on advancements to more challenging activities\".   Total Treatment Duration:  OT Patient Time In/Time Out  Time In: 0930  Time Out: 1015    MARK Alejandra, OTR/L

## 2018-12-26 ENCOUNTER — HOSPITAL ENCOUNTER (OUTPATIENT)
Dept: PHYSICAL THERAPY | Age: 53
Discharge: HOME | End: 2018-12-26
Payer: COMMERCIAL

## 2018-12-26 PROCEDURE — 97110 THERAPEUTIC EXERCISES: CPT

## 2018-12-26 PROCEDURE — 97018 PARAFFIN BATH THERAPY: CPT

## 2018-12-26 PROCEDURE — 97140 MANUAL THERAPY 1/> REGIONS: CPT

## 2018-12-26 NOTE — PROGRESS NOTES
Margie Rizo  : 1965  Primary: [employer]  Secondary:  [address]  at [facility]  Michael 69, [address], [place], [facility]  [phone]   [phone]         OUTPATIENT OCCUPATIONAL THERAPY: Treatment Day: 2018    ICD-10: Treatment Diagnosis: Pain in right wrist (M25.531); Pain in right arm (M79.601); Stiffness of right hand, not elsewhere classified (M25.641); Stiffness of right wrist, not elsewhere classified (M25.631)  Precautions/Allergies:   Patient has no known allergies. Avoid pushing, pulling. No lifting over 2 lbs. MD Orders: Progressive return to work program (progressive hand/UE strengthening and simulating work activities where possible) starting about 14 days post-op. OT 2 X/week X 8 weeks post-op. Instruct patient regarding skin care, scar massage, bathing tips and hourly home active/passive ROM program to maintain/regain finger, thumb wrist, FA, elbow and shoulder function. Encourage light home activities ASAP, and all activities as symptoms as well. Silicone scar treatment prn. MEDICAL/REFERRING DIAGNOSIS:   Carpal tunnel syndrome, right upper limb [G56.01]   DATE OF ONSET:  initial symptoms; s/p surgery  and 2018. REFERRING PHYSICIAN: Megan Sterling MD  RETURN PHYSICIAN APPOINTMENT: 19     INITIAL ASSESSMENT:  Ms. Viv Pizarro presents s/p R carpal tunnel release 18 with decreased functional use, strength, sensation, and range of motion of her right hand, wrist and upper extremity that is affecting her independence with activities of daily living and ability to perform job tasks. I feel that Ms. White will benefit from skilled occupational therapy to maximize the functional use of her upper extremity in daily activities and work tasks. PLAN OF CARE:   PROBLEM LIST:  1. Pain in R wrist/hand. 2. Tingling in R wrist/hand primarily RF/LF and occasionally proximally to elbow.   3. Decreased motion in R wrist/hand/upper extremity. 4. Decreased strength in R wrist/hand/upper extremity. INTERVENTIONS PLANNED:  1. Modalities that may include silicone gel sheets, paraffin, and ultrasound  2. Therapeutic exercise including a home exercise program.  3. Manual therapy. 4. Therapeutic activities. TREATMENT PLAN:  Effective Dates/Frequency/Duration: Twice per week from 12/12/2018 till 2/12/2019 (90 Days). .  GOALS: (Goals have been discussed and agreed upon with patient.)  Short-Term Functional Goals: Time Frame: 4 weeks  1. Decrease pain to 4 to allow patient to perform self care tasks. Met.   2. Increase motion in R finger flexion tips to palm with straight fist to improve functional use of upper extremity in ADL activities. 3. Fold a bandana to simulate work tasks without difficulty to allow patient to  and lift objects during work tasks. 4. Report moderate difficulty or less for carrying a shopping bag via Quick-DASH. Discharge Goals: Time Frame: 8 weeks  1. Decrease pain to 2 to allow patient to perform all household and work tasks. Met.   2. Increase motion in R wrist extension/flexion by 10 degrees to allow patient to perform all ADL activities. Met.   3. Increase strength in R  by 15 pounds to allow patient to , lift, hold, and carry heavy objects. 5. Report moderate or less tingling via Quick-DASH. Rehabilitation Potential For Stated Goals: Good  Thank you for this referral,  Sheryle Dellen, OTD, OTR/L                 The information in this section was collected on 12/12/18 (except where otherwise noted). OCCUPATIONAL PROFILE & HISTORY:   History of Present Injury/Illness (Reason for Referral):  Oumou Garcia is a  who is s/p R carpal tunnel release on 11/13/18 by referring physician. She reports persistent numbness and stiffness in her ring and middle fingers which began about a year ago which is worse when at sleep and when she is gripping.   She reports she is having tingling going up to her elbow as well a couple of times a day more when she is laying down. States she is not sleeping very well. States she has a history of R carpal tunnel syndrome in  after a pregnancy. States she is having diffculty opening up things. States she hasn't been doing a lot of cooking because of the pain in her R hand. She states she is keeping her hand over her head, because the doctor told her to keep it elevated. She is wearing a wrist cock up brace on the R at all times. 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 She has a history of trigger thumb with release done on the R in . History of R carpal tunnel and recently left thumb flexor injection. History of L carpal tunnel release (still wears a wrist cock up brace on the L at night) and surgery on radial wrist because \"my wrist kept locking up. \"    Ms. White  has a past medical history of Chronic groin pain, Environmental allergies, Incisional pain, and Sebaceous cyst.  Ms. Estela Kim  has a past surgical history that includes hx breast reduction (Bilateral); hx  section; hx myomectomy; hx tonsillectomy; hx dilation and curettage; hx hysterectomy (12); hx bunionectomy (Bilateral); EXPLORATION AND REVISION OF PFANNENSTIEL INCISION (Left, 2014); LAPAROSCOPY GYN DIAGNOSTIC (N/A, 2014); EXCISION OF SEBACEOUS CYST LEFT BUTTOCK (Left, 2014); RIGHT THUMB TRIGGER RELEASE (Right, 12/10/2013); HAND CARPAL TUNNEL RELEASE LEFT (Left, 12/10/2013); DEQUERVAINS RELEASE LEFT (Left, 12/10/2013); and HYSTERECTOMY ABDOMINAL TOTAL WITH SALPINGO OOPHORECTOMY (Bilateral, 2012). Social History/Living Environment:    Denies smoking. Denies any   Prior Level of Function/Work/Activity:  Lives with son in apartment. 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 \"  Does not   Dominant Side:         RIGHT  Previous Treatment Approaches:          No therapy. Current Medications:    Current Outpatient Medications: per intake form.   fexofenadine (ALLEGRA) 180 mg tablet, Take 180 mg by mouth daily. Date Last Reviewed:  2018    Number of medical conditions (excluding presenting problem) that affect the Plan of Care: Expanded review of therapy/medical records (1-2):  MODERATE COMPLEXITY   ASSESSMENT OF OCCUPATIONAL PERFORMANCE:   RANGE OF MOTION:     · AROM:   R:  Wrist extension: 70° versus 62°  Wrist flexion: 70°  versus 50°  Finger tips to palm versus ~1-2 cm from palm with straight fist.   Shoulder ER 80-90° shoulder abducted to 90° (complains of pain with this). L:   Wrist extension: 69°  Wrist flexion: 65°  Shoulder ER 80-90° shoulder abducted to 90°  · PROM:   R:  Wrist extension: 69° (painful)  Wrist flexion: 52° (painful)  STRENGTH:     R:   Schafer abduction: 4+/5  Opposition: 4+/5  Thumb adduction: 4/5  FPL: 4+/5  Thumb MP flexion: 4+/5  Digit adduction: IF/SF: 4-/5; RF: 4/5; LF: 4+/5  Digit abduction: IF/SF: 4+/5; RF: 4-/5  Shoulder flexion: 4+/5  Shoulder ER: 4/5  Elbow flexion: 5/5  Elbow extension: 5/5  L:   Schafer abduction: 4/5  Opposition: 4/5  Thumb adduction: 4/5  FPL: 4+/5  Thumb MP flexion: 4+/5  Digit adduction: IF/SF: 4/5; RF: 4+/5; LF: 4+/5  Digit abduction: IF/SF: 4+/5; RF: 4+/5; LF: 4+/5  Shoulder flexion: 4+/5  Shoulder ER: 4+/5  Elbow flexion: 5/5  Elbow extension: 5/5   Strength (Dynamometer)      LUE 1st run lbs 2nd run lbs; 3rd run lbs; 4th run 5th rung: 15 lbs     RUE 1st run lbs 2nd run lbs; 3rd run lbs; 4th run lbs 5th run lbs     SENSATION:    Tucker-Miri Monofilament (D=Dorsally; V=Volarly):  2.83: D: R: Senses over thumb P1/P2. Unable to sense anywhere else. V: Senses 25% of the time over RF/LF P1-3.  Able to sense L/R hands elsewhere.; L: 100% excluding thumb P1/P2;   3.61: D: R: Unable to sense RF P1-3 75% of the time. Unable to sense LF P1-3 30-50% of the time. Able to sense elsewhere R/L hands. V: R/L 100%. 4.31: 100%  4.56:100%  6.65: 100%  EDEMA:  No significant swelling noted. OBSERVATION/PALPATION:  Tenderness (most tender radial proximal portion) over volar scar/incision along proximal thenar crease. Reports tingling RF/LF at times during scar massage. SPECIAL TESTS:  Jarold Flurry: -; Tinel's: over carpal tunnel: -; over cubital tunnel: +; Mode pick-up R/L: 11/10 seconds; eyes occluded 20 seconds. Upper limb tension test: RUE positive for median nerve     Physical Skills Involved:  1. Range of Motion  2. Strength  3. Activity Tolerance  4. Sensation  5. Skin Integrity Cognitive Skills Affected (resulting in the inability to perform in a timely and safe manner):  1. None noted Psychosocial Skills Affected:  1. Habits/Routines  2. Environmental Adaptation  3. Social Interaction  4. Social Roles   Number of elements that affect the Plan of Care: 1-3:  LOW COMPLEXITY   CLINICAL DECISION MAKING:   Outcome Measure: Tool Used: Disabilities of the Arm, Shoulder and Hand (DASH) Questionnaire - Quick Version  Score:  Initial: 39/55 (ommitted #6) Most Recent: X/55 (Date: -- )   Interpretation of Score: The DASH is designed to measure the activities of daily living in person's with upper extremity dysfunction or pain. Each section is scored on a 1-5 scale, 5 representing the greatest disability. The scores of each section are added together for a total score of 55. Medical Necessity:   · Patient demonstrates good rehab potential due to higher previous functional level. Reason for Services/Other Comments:  · Patient continues to require skilled intervention due to decreased independence with ADL, instrumental ADL and work tasks.   Clinical Decision-Making Assessment:  Daisy White required none to minimal verbal, visual, physical or environmental cues to carry out assessment tasks. Clinical Decision-Making: LOW COMPLEXITY   TREATMENT:   (In addition to Assessment/Re-Assessment sessions the following treatments were rendered)  Pre-treatment Symptoms/Complaints:  Numbness/tingling R RF/LF. Reports no tingling going up her elbow over the weekend. Pain: Initial:   Pain Intensity 1: 0  Pain Location 1: Hand, Shoulder  Pain Orientation 1: Right  Post Session:  2/10     MODALITIES: (0-5 minutes): *  Hot Pack Therapy in order to provide analgesia and reduce stiffness R shoulder. MODALITIES: (5 minutes): *  ParaffinTherapy in order to provide analgesia and reduce stiffness R hand/wrist and to improve tissue extensibility in preparation for therapeutic exercise/activity. Therapeutic Exercise: (  25 minutes):  Exercises per grid below to improve mobility, strength, coordination and dynamic movement of hand - right and wrist - right to improve functional reaching and grasping/releasing. Required moderate visual, verbal, manual and tactile cues to promote proper body alignment, promote proper body posture and promote proper body mechanics. Progressed resistance, range, repetitions and complexity of movement as indicated. Date:  12/12 Date:  12/14 Date:  12/17 Date:  12/20 Date:  12/26   Activity/Exercise Parameters Parameters Parameters Parameters Parameters   Tendon glides 3 sets 5-10 reps (pain in R shoulder with straight fist) 1. 3 sets 5-10 reps (mild pain in R shoulder with straight fist). 2. Composite fist with wrist extension 5 reps. 1-2 sets 5-10 reps. 1-2 sets 5-10 reps. 1-2 sets 3-5 reps. Long finger extensor stretch  1. Composite flexion assist from therapist 2-3 sets held 2-3 minutes each. Composite flexion assist from therapist 1-2 sets held 2-3 minutes each. Composite flexion assist from therapist 1-2 sets held 2-3 minutes each. Composite flexion assist from therapist 1-2 sets held 2-3 minutes each. Long finger extensor stretch  1. Against base of ramp 2-3 sets held 30-60 seconds. 2. Prayer  Stretch 2-3 sets held 15-30 seconds. 1. Against base of ramp 2-3 sets held 30-60 seconds. 2. Prayer  Stretch 1-2 sets held 15-30 seconds. Against base of ramp 2-3 sets held 2-3 minutes. Against base of ramp 2-3 sets held 2-3 minutes. Individual finger extension  In full wrist extension off of back of ramp 4-5 reps. In full wrist extension off of back of ramp 4-5 reps. Individual finger extension off of back of ramp 4-5 reps each finger (RF required AAROM - holding isometrically 2-3 reps) Individual finger extension off of back of ramp 4-5 reps each finger   Intrinsic strengthening  1. Digit adduction tan theraputty 2 sets 5-10 reps. 2. Digit abduction rubberband resistance 5-10 reps. 1. Digit adduction tan theraputty 1-2 sets 5-10 reps. 2. Digit abduction rubberband resistance 1-2 sets 5-10 reps RF adduction 5-10 reps AROM RF adduction 5-10 reps AROM   Hook fisting     Guerra theraputty 5 reps   Wrist extension/flexion    10-15 reps 1 lb dumbbell each. 10-15 reps 2 lb dumbbell each. Wrist radial/ulnar deviation    10-15 reps 1 lb dumbbell 10-15 reps 2 lb dumbbell each. Thumb cordova abduction   1. 5-10 reps AROM. 2. 5-10 reps with rubber band. Shoulder external rotation   Shoulders adducted A: 4-5 reps yellow theraband (reported some dull pain in R shoulder with this). B: 5-10 reps AROM. 1st rib mobization     Utilizing belt hold 15-30 seconds then add cervical rotation L and flexion for gentle stretch held 15-30 seconds. Median nerve glides   1. Shoulder adducted 3 sets 3-4 reps. 2. Shoulder abducted to 90° A: elbow extended 1-2 sets 4-5 reps. B: Elbow flex/extend then laterally flex neck L 1-2 sets 2-3 reps. C:  Also completed upper limb tension for for median nerve. 1. Shoulder adducted 3 sets 3-4 reps. 2. Shoulder abducted to 90° A: elbow extended 1-2 sets 4-5 reps.      Therapeutic Activity: ( 0 minute): Therapeutic activities including Carry objects and manipulate objects to improve mobility and coordination. Required minimal   to promote coordination of right, upper extremity(s). Patient folded a scarf several times to simulate work tasks without significant difficulty. Manual Therapy: (13 minutes): Soft tissue mobilization was utilized and necessary because of the patient's restricted joint motion and restricted motion of soft tissue. Gentle longitudinal and horizontal scar massage completed with scar massage tool (rubber tip). Patient reported no tingling in LF/RF. Also, placed a strip of 1.5 cm X 3 cm transpore paper tape along scar with mild-moderate tension. Home program: As above. Treatment/Session Assessment:  Isaiah White reports she continues to apply Transpore tape nightly/daily over incision to smooth scar. She reports no significant RUE pain since last visit except for when it rained last.  She has added strengthening exercises recently. Continue OT per plan of care. · Response to Treatment:  Patients tolerated treatment Well with no medical complications. Upon completion of treatment, skin condition was intact/without erythema. .  · Compliance with Program/Exercises: compliant today. · Recommendations/Intent for next treatment session: \"Next visit will focus on advancements to more challenging activities\".   Total Treatment Duration:  OT Patient Time In/Time Out  Time In: 7355  Time Out: Allen Staton 6342 MARK Hawkins, OTR/L

## 2018-12-31 ENCOUNTER — HOSPITAL ENCOUNTER (OUTPATIENT)
Dept: PHYSICAL THERAPY | Age: 53
Discharge: HOME | End: 2018-12-31
Payer: COMMERCIAL

## 2018-12-31 PROCEDURE — 97110 THERAPEUTIC EXERCISES: CPT

## 2018-12-31 PROCEDURE — 97018 PARAFFIN BATH THERAPY: CPT

## 2018-12-31 NOTE — PROGRESS NOTES
Ced Ribeiro  : 1965  Primary: [employer]  Secondary:  [address] at [address] [facility], [address], [place], [address] [facility] [place]  [phone]   [phone]         OUTPATIENT OCCUPATIONAL THERAPY: Treatment Day: 2018    ICD-10: Treatment Diagnosis: Pain in right wrist (M25.531); Pain in right arm (M79.601); Stiffness of right hand, not elsewhere classified (M25.641); Stiffness of right wrist, not elsewhere classified (M25.631)  Precautions/Allergies:   Patient has no known allergies. Avoid pushing, pulling. No lifting over 2 lbs. MD Orders: Progressive return to work program (progressive hand/UE strengthening and simulating work activities where possible) starting about 14 days post-op. OT 2 X/week X 8 weeks post-op. Instruct patient regarding skin care, scar massage, bathing tips and hourly home active/passive ROM program to maintain/regain finger, thumb wrist, FA, elbow and shoulder function. Encourage light home activities ASAP, and all activities as symptoms as well. Silicone scar treatment prn. MEDICAL/REFERRING DIAGNOSIS:   Carpal tunnel syndrome, right upper limb [G56.01]   DATE OF ONSET:  initial symptoms; s/p surgery  and 2018. REFERRING PHYSICIAN: Kathi Stoll MD  RETURN PHYSICIAN APPOINTMENT: 19     INITIAL ASSESSMENT:  Ms. Juan Reyes presents s/p R carpal tunnel release 18 with decreased functional use, strength, sensation, and range of motion of her right hand, wrist and upper extremity that is affecting her independence with activities of daily living and ability to perform job tasks. I feel that Ms. White will benefit from skilled occupational therapy to maximize the functional use of her upper extremity in daily activities and work tasks. PLAN OF CARE:   PROBLEM LIST:  1. Pain in R wrist/hand. 2. Tingling in R wrist/hand primarily RF/LF and occasionally proximally to elbow.   3. Decreased motion in R wrist/hand/upper extremity. 4. Decreased strength in R wrist/hand/upper extremity. INTERVENTIONS PLANNED:  1. Modalities that may include silicone gel sheets, paraffin, and ultrasound  2. Therapeutic exercise including a home exercise program.  3. Manual therapy. 4. Therapeutic activities. TREATMENT PLAN:  Effective Dates/Frequency/Duration: Twice per week from 12/12/2018 till 2/12/2019 (90 Days). .  GOALS: (Goals have been discussed and agreed upon with patient.)  Short-Term Functional Goals: Time Frame: 4 weeks  1. Decrease pain to 4 to allow patient to perform self care tasks. Met.   2. Increase motion in R finger flexion tips to palm with straight fist to improve functional use of upper extremity in ADL activities. 3. Fold a bandana to simulate work tasks without difficulty to allow patient to  and lift objects during work tasks. 4. Report moderate difficulty or less for carrying a shopping bag via Quick-DASH. Discharge Goals: Time Frame: 8 weeks  1. Decrease pain to 2 to allow patient to perform all household and work tasks. Met.   2. Increase motion in R wrist extension/flexion by 10 degrees to allow patient to perform all ADL activities. Met.   3. Increase strength in R  by 15 pounds to allow patient to , lift, hold, and carry heavy objects. 5. Report moderate or less tingling via Quick-DASH. Rehabilitation Potential For Stated Goals: Good  Thank you for this referral,  MARK Stewart, OTR/L                 The information in this section was collected on 12/12/18 (except where otherwise noted). OCCUPATIONAL PROFILE & HISTORY:   History of Present Injury/Illness (Reason for Referral):  Dewayne Walsh is a  who is s/p R carpal tunnel release on 11/13/18 by referring physician. She reports persistent numbness and stiffness in her ring and middle fingers which began about a year ago which is worse when at sleep and when she is gripping.   She reports she is having tingling going up to her elbow as well a couple of times a day more when she is laying down. States she is not sleeping very well. States she has a history of R carpal tunnel syndrome in  after a pregnancy. States she is having diffculty opening up things. States she hasn't been doing a lot of cooking because of the pain in her R hand. She states she is keeping her hand over her head, because the doctor told her to keep it elevated. She is wearing a wrist cock up brace on the R at all times. 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 She has a history of trigger thumb with release done on the R in . History of R carpal tunnel and recently left thumb flexor injection. History of L carpal tunnel release (still wears a wrist cock up brace on the L at night) and surgery on radial wrist because \"my wrist kept locking up. \"    Ms. White  has a past medical history of Chronic groin pain, Environmental allergies, Incisional pain, and Sebaceous cyst.  Ms. Loulou Corrigan  has a past surgical history that includes hx breast reduction (Bilateral); hx  section; hx myomectomy; hx tonsillectomy; hx dilation and curettage; hx hysterectomy (12); hx bunionectomy (Bilateral); EXPLORATION AND REVISION OF PFANNENSTIEL INCISION (Left, 2014); LAPAROSCOPY GYN DIAGNOSTIC (N/A, 2014); EXCISION OF SEBACEOUS CYST LEFT BUTTOCK (Left, 2014); RIGHT THUMB TRIGGER RELEASE (Right, 12/10/2013); HAND CARPAL TUNNEL RELEASE LEFT (Left, 12/10/2013); DEQUERVAINS RELEASE LEFT (Left, 12/10/2013); and HYSTERECTOMY ABDOMINAL TOTAL WITH SALPINGO OOPHORECTOMY (Bilateral, 2012). Social History/Living Environment:    Denies smoking. Denies any   Prior Level of Function/Work/Activity:  Lives with son in apartment. 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 \"  Does not   Dominant Side:         RIGHT  Previous Treatment Approaches:          No therapy. Current Medications:    Current Outpatient Medications: per intake form.   fexofenadine (ALLEGRA) 180 mg tablet, Take 180 mg by mouth daily. Date Last Reviewed:  2018    Number of medical conditions (excluding presenting problem) that affect the Plan of Care: Expanded review of therapy/medical records (1-2):  MODERATE COMPLEXITY   ASSESSMENT OF OCCUPATIONAL PERFORMANCE:   RANGE OF MOTION:     · AROM:   R:  Wrist extension: 70° versus 62°  Wrist flexion: 70°  versus 50°  Finger tips to palm versus ~1-2 cm from palm with straight fist.   Shoulder ER 80-90° shoulder abducted to 90° (complains of pain with this). L:   Wrist extension: 69°  Wrist flexion: 65°  Shoulder ER 80-90° shoulder abducted to 90°  · PROM:   R:  Wrist extension: 69° (painful)  Wrist flexion: 52° (painful)  STRENGTH:     R:   Schafer abduction: 4+/5  Opposition: 4+/5  Thumb adduction: 4/5  FPL: 4+/5  Thumb MP flexion: 4+/5  Digit adduction: IF/SF: 4-/5; RF: 4/5; LF: 4+/5  Digit abduction: IF/SF: 4+/5; RF: 4-/5  Shoulder flexion: 4+/5  Shoulder ER: 4/5  Elbow flexion: 5/5  Elbow extension: 5/5  L:   Schafer abduction: 4/5  Opposition: 4/5  Thumb adduction: 4/5  FPL: 4+/5  Thumb MP flexion: 4+/5  Digit adduction: IF/SF: 4/5; RF: 4+/5; LF: 4+/5  Digit abduction: IF/SF: 4+/5; RF: 4+/5; LF: 4+/5  Shoulder flexion: 4+/5  Shoulder ER: 4+/5  Elbow flexion: 5/5  Elbow extension: 5/5   Strength (Dynamometer)      LUE 1st run lbs 2nd run lbs; 3rd run lbs; 4th run 5th rung: 15 lbs     RUE 1st run lbs 2nd run lbs; 3rd run lbs; 4th run lbs 5th run lbs     SENSATION:    Staples-Miri Monofilament (D=Dorsally; V=Volarly):  2.83: D: R: Senses over thumb P1/P2. Unable to sense anywhere else. V: Senses 25% of the time over RF/LF P1-3.  Able to sense L/R hands elsewhere.; L: 100% excluding thumb P1/P2;   3.61: D: R: Unable to sense RF P1-3 75% of the time. Unable to sense LF P1-3 30-50% of the time. Able to sense elsewhere R/L hands. V: R/L 100%. 4.31: 100%  4.56:100%  6.65: 100%  EDEMA:  No significant swelling noted. OBSERVATION/PALPATION:  Tenderness (most tender radial proximal portion) over volar scar/incision along proximal thenar crease. Reports tingling RF/LF at times during scar massage. SPECIAL TESTS:  Jose Pugh: -; Tinel's: over carpal tunnel: -; over cubital tunnel: +; Mode pick-up R/L: 11/10 seconds; eyes occluded 20 seconds. Upper limb tension test: RUE positive for median nerve     Physical Skills Involved:  1. Range of Motion  2. Strength  3. Activity Tolerance  4. Sensation  5. Skin Integrity Cognitive Skills Affected (resulting in the inability to perform in a timely and safe manner):  1. None noted Psychosocial Skills Affected:  1. Habits/Routines  2. Environmental Adaptation  3. Social Interaction  4. Social Roles   Number of elements that affect the Plan of Care: 1-3:  LOW COMPLEXITY   CLINICAL DECISION MAKING:   Outcome Measure: Tool Used: Disabilities of the Arm, Shoulder and Hand (DASH) Questionnaire - Quick Version  Score:  Initial: 39/55 (ommitted #6) Most Recent: X/55 (Date: -- )   Interpretation of Score: The DASH is designed to measure the activities of daily living in person's with upper extremity dysfunction or pain. Each section is scored on a 1-5 scale, 5 representing the greatest disability. The scores of each section are added together for a total score of 55. Medical Necessity:   · Patient demonstrates good rehab potential due to higher previous functional level. Reason for Services/Other Comments:  · Patient continues to require skilled intervention due to decreased independence with ADL, instrumental ADL and work tasks.   Clinical Decision-Making Assessment:  Shiv White required none to minimal verbal, visual, physical or environmental cues to carry out assessment tasks. Clinical Decision-Making: LOW COMPLEXITY   TREATMENT:   (In addition to Assessment/Re-Assessment sessions the following treatments were rendered)  Pre-treatment Symptoms/Complaints:  Numbness/tingling R RF/LF. Reports no tingling going up her elbow over the weekend. Pain: Initial:   Pain Intensity 1: 4(none in hand)  Pain Location 1: Shoulder  Pain Orientation 1: Right  Post Session:  2/10     MODALITIES: (0-5 minutes): *  Hot Pack Therapy in order to provide analgesia and reduce stiffness R shoulder and R hand. Julio Opoka MODALITIES: (5 minutes): *  ParaffinTherapy in order to provide analgesia and reduce stiffness R hand/wrist and to improve tissue extensibility in preparation for therapeutic exercise/activity. Therapeutic Exercise: (  30 minutes):  Exercises per grid below to improve mobility, strength, coordination and dynamic movement of hand - right and wrist - right to improve functional reaching and grasping/releasing. Required moderate visual, verbal, manual and tactile cues to promote proper body alignment, promote proper body posture and promote proper body mechanics. Progressed resistance, range, repetitions and complexity of movement as indicated. Date:  12/12 Date:  12/14 Date:  12/17 Date:  12/20 Date:  12/26 Date:  12/31   Activity/Exercise Parameters Parameters Parameters Parameters Parameters Parameters   Tendon glides 3 sets 5-10 reps (pain in R shoulder with straight fist) 1. 3 sets 5-10 reps (mild pain in R shoulder with straight fist). 2. Composite fist with wrist extension 5 reps. 1-2 sets 5-10 reps. 1-2 sets 5-10 reps. 1-2 sets 3-5 reps. Long finger extensor stretch  1. Composite flexion assist from therapist 2-3 sets held 2-3 minutes each. Composite flexion assist from therapist 1-2 sets held 2-3 minutes each. Composite flexion assist from therapist 1-2 sets held 2-3 minutes each.  Composite flexion assist from therapist 1-2 sets held 2-3 minutes each. Composite flexion with wrist flexion held 2-3 minutes each. Long finger extensor stretch  1. Against base of ramp 2-3 sets held 30-60 seconds. 2. Prayer  Stretch 2-3 sets held 15-30 seconds. 1. Against base of ramp 2-3 sets held 30-60 seconds. 2. Prayer  Stretch 1-2 sets held 15-30 seconds. Against base of ramp 2-3 sets held 2-3 minutes. Against base of ramp 2-3 sets held 2-3 minutes. 1. Long finger flexors with elbow extension 2-3 sets held 30-60 seconds. 2. Prayer  Stretch 1-2 sets held 15-30 seconds. Individual finger extension  In full wrist extension off of back of ramp 4-5 reps. In full wrist extension off of back of ramp 4-5 reps. Individual finger extension off of back of ramp 4-5 reps each finger (RF required AAROM - holding isometrically 2-3 reps) Individual finger extension off of back of ramp 4-5 reps each finger    Composite finger extension      Tan theraputty 10 reps. Intrinsic strengthening  1. Digit adduction tan theraputty 2 sets 5-10 reps. 2. Digit abduction rubberband resistance 5-10 reps. 1. Digit adduction tan theraputty 1-2 sets 5-10 reps. 2. Digit abduction rubberband resistance 1-2 sets 5-10 reps RF adduction 5-10 reps AROM RF adduction 5-10 reps AROM 1.RF adduction 5-10 reps AROM. 2. Digit adduction tan theraputty 1-2 sets 5-10 reps. 3. Digit abduction rubberband resistance 1-2 sets 5-10 reps     Hook fisting     Tan theraputty 5 reps Tan theraputty 10 reps   Wrist extension/flexion    10-15 reps 1 lb dumbbell each. 10-15 reps 2 lb dumbbell each. 1.  and pull 2 sets 10-15 reps  orange/tan theraputty. Wrist radial/ulnar deviation    10-15 reps 1 lb dumbbell 10-15 reps 2 lb dumbbell each.  and pull 5-10 reps orange/tan theraputty radial deviation. Thumb cordova abduction   1. 5-10 reps AROM. 2. 5-10 reps with rubber band.       Shoulder external rotation   Shoulders adducted A: 4-5 reps yellow theraband (reported some dull pain in R shoulder with this). B: 5-10 reps AROM. 1st rib mobization     Utilizing belt hold 15-30 seconds then add cervical rotation L and flexion for gentle stretch held 15-30 seconds. Utilizing belt hold 15-30 seconds then add cervical rotation L and flexion for gentle stretch held 15-30 seconds. Median nerve glides   1. Shoulder adducted 3 sets 3-4 reps. 2. Shoulder abducted to 90° A: elbow extended 1-2 sets 4-5 reps. B: Elbow flex/extend then laterally flex neck L 1-2 sets 2-3 reps. C:  Also completed upper limb tension for for median nerve. 1. Shoulder adducted 3 sets 3-4 reps. 2. Shoulder abducted to 90° A: elbow extended 1-2 sets 4-5 reps. Therapeutic Activity: (    0 minute): Therapeutic activities including Carry objects and manipulate objects to improve mobility and coordination. Required minimal   to promote coordination of right, upper extremity(s). Patient folded a scarf several times to simulate work tasks without significant difficulty. Manual Therapy: (10 minutes): Soft tissue mobilization was utilized and necessary because of the patient's restricted joint motion and restricted motion of soft tissue. Gentle longitudinal and horizontal scar massage completed with scar massage tool (rubber tip). Patient reported no tingling in LF/RF. Also, placed a strip of 1.5 cm X 3 cm transpore paper tape along scar with mild-moderate tension. Home program: As above. Treatment/Session Assessment:  Giorgi White reports improvements in pain with her R hand, but still present in R shoulder (4/10). We have added strengthening exercises and slightly more aggressive finger flexor/extensor stretches. Plan for progress report next visit. Continue OT per plan of care. · Response to Treatment:  Patients tolerated treatment Well with no medical complications. Upon completion of treatment, skin condition was intact/without erythema. .  · Compliance with Program/Exercises: compliant today.  · Recommendations/Intent for next treatment session: \"Next visit will focus on advancements to more challenging activities\".   Total Treatment Duration:  OT Patient Time In/Time Out  Time In: 1015  Time Out: [id number] [facility] MARK Hawkins, OTR/L

## 2019-01-02 ENCOUNTER — HOSPITAL ENCOUNTER (OUTPATIENT)
Dept: PHYSICAL THERAPY | Age: 54
Discharge: HOME | End: 2019-01-02
Payer: COMMERCIAL

## 2019-01-02 PROCEDURE — 97140 MANUAL THERAPY 1/> REGIONS: CPT

## 2019-01-02 PROCEDURE — 97110 THERAPEUTIC EXERCISES: CPT

## 2019-01-02 PROCEDURE — 97018 PARAFFIN BATH THERAPY: CPT

## 2019-01-02 NOTE — THERAPY DISCHARGE
Kevin Armendariz  : 1965  Primary: [employer]  Secondary:  [address]  at [facility]  [address], [address], [place], [facility]  [phone]   [phone]         OUTPATIENT OCCUPATIONAL THERAPY: Discharge 2019    ICD-10: Treatment Diagnosis: Pain in right wrist (M25.531); Pain in right arm (M79.601); Stiffness of right hand, not elsewhere classified (M25.641); Stiffness of right wrist, not elsewhere classified (M25.631)  Precautions/Allergies:   Patient has no known allergies. Avoid pushing, pulling. No lifting over 2 lbs. MD Orders: Progressive return to work program (progressive hand/UE strengthening and simulating work activities where possible) starting about 14 days post-op. OT 2 X/week X 8 weeks post-op. Instruct patient regarding skin care, scar massage, bathing tips and hourly home active/passive ROM program to maintain/regain finger, thumb wrist, FA, elbow and shoulder function. Encourage light home activities ASAP, and all activities as symptoms as well. Silicone scar treatment prn. MEDICAL/REFERRING DIAGNOSIS:   Carpal tunnel syndrome, right upper limb [G56.01]   DATE OF ONSET:  initial symptoms; s/p surgery  and 2018. REFERRING PHYSICIAN: Cathie Cano MD  RETURN PHYSICIAN APPOINTMENT: 19     PROGRESS REPORT/DISCHARGE SUMMARY:  Ms. Mona Wiggins attended 7 outpatient occupational therapy appointments from 18 to 19 s/p R carpal tunnel release 18 meeting all of her short and long term goals focusing on scar massage, ROM, and light strengthening of her wrist, hand and RUE. Her  strength is 30 lbs versus 7 lbs and she has full AROM in her fingers. Wrist extension measured 70° and flexion 62° with improved flexibility. Lastly, she reports improved overall functional independence and less disability per [facility] scoring a 20 versus 39/55 (lower is better).   She will be discharged at this time with her home program.  Miss Destini Hunt verbalized agreement/understanding with this plan. Thank you for the opportunity to work with Miss Destini Hunt! PLAN OF CARE:   TREATMENT PLAN:  Effective Dates/Frequency/Duration: Twice per week from 12/12/2018 till 1/3/2019 (90 Days). GOALS: (Goals have been discussed and agreed upon with patient.)  Short-Term Functional Goals: Time Frame: 4 weeks  1. Decrease pain to 4 to allow patient to perform self care tasks. Met.   2. Increase motion in R finger flexion tips to palm with straight fist to improve functional use of upper extremity in ADL activities. Met.   3. Fold a bandana to simulate work tasks without difficulty to allow patient to  and lift objects during work tasks. Met.   4. Report moderate difficulty or less for carrying a shopping bag via Quick-DASH. Met. Discharge Goals: Time Frame: 8 weeks  1. Decrease pain to 2 to allow patient to perform all household and work tasks. Met.   2. Increase motion in R wrist extension/flexion by 10 degrees to allow patient to perform all ADL activities. Met.   3. Increase strength in R  by 15 pounds to allow patient to , lift, hold, and carry heavy objects. Met.   5. Report moderate or less tingling via Quick-DASH. Met. Thank you for this referral,  Pao Valle, MARK, OTR/L                 The information in this section was collected on 12/12/18 (except where otherwise noted). OCCUPATIONAL PROFILE & HISTORY:   History of Present Injury/Illness (Reason for Referral):  [place] is a  who is s/p R carpal tunnel release on 11/13/18 by referring physician. She reports persistent numbness and stiffness in her ring and middle fingers which began about a year ago which is worse when at sleep and when she is gripping. She reports she is having tingling going up to her elbow as well a couple of times a day more when she is laying down. States she is not sleeping very well.   States she has a history of R carpal tunnel syndrome in  after a pregnancy. States she is having diffculty opening up things. States she hasn't been doing a lot of cooking because of the pain in her R hand. She states she is keeping her hand over her head, because the doctor told her to keep it elevated. She is wearing a wrist cock up brace on the R at all times. 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 She has a history of trigger thumb with release done on the R in . History of R carpal tunnel and recently left thumb flexor injection. History of L carpal tunnel release (still wears a wrist cock up brace on the L at night) and surgery on radial wrist because \"my wrist kept locking up. \"    Ms. White  has a past medical history of Chronic groin pain, Environmental allergies, Incisional pain, and Sebaceous cyst.  Ms. Nimo Andre  has a past surgical history that includes hx breast reduction (Bilateral); hx  section; hx myomectomy; hx tonsillectomy; hx dilation and curettage; hx hysterectomy (12); hx bunionectomy (Bilateral); EXPLORATION AND REVISION OF PFANNENSTIEL INCISION (Left, 2014); LAPAROSCOPY GYN DIAGNOSTIC (N/A, 2014); EXCISION OF SEBACEOUS CYST LEFT BUTTOCK (Left, 2014); RIGHT THUMB TRIGGER RELEASE (Right, 12/10/2013); HAND CARPAL TUNNEL RELEASE LEFT (Left, 12/10/2013); DEQUERVAINS RELEASE LEFT (Left, 12/10/2013); and HYSTERECTOMY ABDOMINAL TOTAL WITH SALPINGO OOPHORECTOMY (Bilateral, 2012). Social History/Living Environment:    Denies smoking. Denies any   Prior Level of Function/Work/Activity:  Lives with son in apartment. 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 \"  Does not   Dominant Side:         RIGHT  Previous Treatment Approaches:          No therapy.   Current Medications: Current Outpatient Medications: per intake form.   fexofenadine (ALLEGRA) 180 mg tablet, Take 180 mg by mouth daily. Date Last Reviewed:  2019    Number of medical conditions (excluding presenting problem) that affect the Plan of Care: Expanded review of therapy/medical records (1-2):  MODERATE COMPLEXITY   ASSESSMENT OF OCCUPATIONAL PERFORMANCE:   RANGE OF MOTION:     · AROM:   R:  Wrist extension: 70° versus 62°  Wrist flexion: 62°  versus 50°  Finger tips to palm versus ~1-2 cm from palm with straight fist.   Shoulder ER 80-90° shoulder abducted to 90° (complains of pain with this). L:   Wrist extension: 69°  Wrist flexion: 65°  Shoulder ER 80-90° shoulder abducted to 90°  STRENGTH:     R:   Schafer abduction: 5 versus 4+/5  Opposition: 5 versus 4+/5  Thumb adduction: 5 versus  4/5  FPL: 5 versus 4+/5  Thumb MP flexion: 4+/5  Digit adduction: IF/SF:5 versus  4-/5; RF: 4/5; LF: 4+/5  Digit abduction: IF/SF: 5 versus 4+/5; RF: 4-/5  Shoulder flexion: 4+/5  Shoulder ER: 4/5  Elbow flexion: 5/5  Elbow extension: 5/5  L:   Schafer abduction: 4/5  Opposition: 4/5  Thumb adduction: 4/5  FPL: 4+/5  Thumb MP flexion: 4+/5  Digit adduction: IF/SF: 4/5; RF: 4+/5; LF: 4+/5  Digit abduction: IF/SF: 4+/5; RF: 4+/5; LF: 4+/5  Shoulder flexion: 4+/5  Shoulder ER: 4+/5  Elbow flexion: 5/5  Elbow extension: 5/5   Strength (Dynamometer) 18    LUE 1st run lbs 2nd run lbs; 3rd run lbs; 4th run 5th rung: 15 lbs     RUE 1st run lbs 2nd run lbs; 3rd run lbs; 4th run lbs 5th run lbs 30 lbs 2nd rung. SENSATION:    [place]-Miri Monofilament (D=Dorsally; V=Volarly):  2.83: D: R: Senses over thumb P1/P2. Unable to sense anywhere else. V: Senses 75% versus 25% of the time over RF/LF P1-3. Able to sense L/R hands elsewhere.; L: 100% excluding thumb P1/P2;   3.61: D: R: Senses RF P1-3 75% versus 25% of the time. Senses LF P1-3 100% versus 30-50% of the time.   Able to sense elsewhere R/L hands. V: R/L 100%. 4.31: 100%  4.56:100%  6.65: 100%  EDEMA:  No significant swelling noted. OBSERVATION/PALPATION:  Tenderness (most tender radial proximal portion) over volar scar/incision along proximal thenar crease. Reports tingling RF/LF at times during scar massage. SPECIAL TESTS:  Ivy Domingo: -; Tinel's: over carpal tunnel: -; over cubital tunnel: +; Mode pick-up R/L: 11/10 seconds; eyes occluded 20 seconds. Upper limb tension test: RUE positive for median nerve     Physical Skills Involved:  1. Range of Motion  2. Strength  3. Activity Tolerance  4. Sensation  5. Skin Integrity Cognitive Skills Affected (resulting in the inability to perform in a timely and safe manner):  1. None noted Psychosocial Skills Affected:  1. Habits/Routines  2. Environmental Adaptation  3. Social Interaction  4. Social Roles   Number of elements that affect the Plan of Care: 1-3:  LOW COMPLEXITY   CLINICAL DECISION MAKING:   Outcome Measure: Tool Used: Disabilities of the Arm, Shoulder and Hand (DASH) Questionnaire - Quick Version  Score:  Initial: 39/55 (ommitted #6) Most Recent: 20/55 (Date: 1/2/19)   Interpretation of Score: The DASH is designed to measure the activities of daily living in person's with upper extremity dysfunction or pain. Each section is scored on a 1-5 scale, 5 representing the greatest disability. The scores of each section are added together for a total score of 55. Medical Necessity:   · Patient demonstrates good rehab potential due to higher previous functional level. Reason for Services/Other Comments:  · Patient continues to require skilled intervention due to decreased independence with ADL, instrumental ADL and work tasks. Clinical Decision-Making Assessment:  Pasha Torsten White required none to minimal verbal, visual, physical or environmental cues to carry out assessment tasks.    Clinical Decision-Making: LOW COMPLEXITY   TREATMENT:   (In addition to Assessment/Re-Assessment sessions the following treatments were rendered)  Pre-treatment Symptoms/Complaints:  Numbness/tingling R RF/LF. Reports no tingling going up her elbow over the weekend. Pain: Initial:   Pain Intensity 1: 4(none in hand)  Pain Location 1: Shoulder  Pain Orientation 1: Right  Post Session:  2/10       Treatment/Session Assessment:See treatment note. · Response to Treatment:  Patients tolerated treatment Well with no medical complications. Upon completion of treatment, skin condition was intact/without erythema. .  · Compliance with Program/Exercises: compliant today. · Recommendations/Intent for next treatment session: \"Next visit will focus on N/A\".   Total Treatment Duration:  OT Patient Time In/Time Out  Time In: 1300  Time Out: [address] MARK Echavarria, OTR/L

## 2019-01-02 NOTE — PROGRESS NOTES
Sly Santa  : 1965  Primary: [facility]  Secondary:  [address]  at [facility]  [address], [address], [place], [facility] [place] [facility]  [phone]   [phone]         OUTPATIENT OCCUPATIONAL THERAPY: Treatment Day: 2019    ICD-10: Treatment Diagnosis: Pain in right wrist (M25.531); Pain in right arm (M79.601); Stiffness of right hand, not elsewhere classified (M25.641); Stiffness of right wrist, not elsewhere classified (M25.631)  Precautions/Allergies:   Patient has no known allergies. Avoid pushing, pulling. No lifting over 2 lbs. MD Orders: Progressive return to work program (progressive hand/UE strengthening and simulating work activities where possible) starting about 14 days post-op. OT 2 X/week X 8 weeks post-op. Instruct patient regarding skin care, scar massage, bathing tips and hourly home active/passive ROM program to maintain/regain finger, thumb wrist, FA, elbow and shoulder function. Encourage light home activities ASAP, and all activities as symptoms as well. Silicone scar treatment prn. MEDICAL/REFERRING DIAGNOSIS:   Carpal tunnel syndrome, right upper limb [G56.01]   DATE OF ONSET:  initial symptoms; s/p surgery  and 2018. REFERRING PHYSICIAN: Danielle Lowe MD  RETURN PHYSICIAN APPOINTMENT: 19     PROGRESS REPORT/DISCHARGE SUMMARY:  Ms. Renee Wilson attended 7 outpatient occupational therapy appointments from 18 to 19 s/p R carpal tunnel release 18 meeting all of her short and long term goals focusing on scar massage, ROM, and light strengthening of her wrist, hand and RUE. Her  strength is 30 lbs versus 7 lbs and she has full AROM in her fingers. Wrist extension measured 70° and flexion 62° with improved flexibility. Lastly, she reports improved overall functional independence and less disability per [facility] scoring a 20 versus 39/55 (lower is better).   She will be discharged at this time with her home program.  Miss Marla Butt verbalized agreement/understanding with this plan. Thank you for the opportunity to work with Miss Marla Butt! PLAN OF CARE:   TREATMENT PLAN:  Effective Dates/Frequency/Duration: Twice per week from 12/12/2018 till 1/3/2019 (90 Days). GOALS: (Goals have been discussed and agreed upon with patient.)  Short-Term Functional Goals: Time Frame: 4 weeks  1. Decrease pain to 4 to allow patient to perform self care tasks. Met.   2. Increase motion in R finger flexion tips to palm with straight fist to improve functional use of upper extremity in ADL activities. Met.   3. Fold a bandana to simulate work tasks without difficulty to allow patient to  and lift objects during work tasks. Met.   4. Report moderate difficulty or less for carrying a shopping bag via Quick-DASH. Met. Discharge Goals: Time Frame: 8 weeks  1. Decrease pain to 2 to allow patient to perform all household and work tasks. Met.   2. Increase motion in R wrist extension/flexion by 10 degrees to allow patient to perform all ADL activities. Met.   3. Increase strength in R  by 15 pounds to allow patient to , lift, hold, and carry heavy objects. Met.   5. Report moderate or less tingling via Quick-DASH. Met. Thank you for this referral,  MARK Huang, OTR/L                 The information in this section was collected on 12/12/18 (except where otherwise noted). OCCUPATIONAL PROFILE & HISTORY:   History of Present Injury/Illness (Reason for Referral):  Susan Shaw is a  who is s/p R carpal tunnel release on 11/13/18 by referring physician. She reports persistent numbness and stiffness in her ring and middle fingers which began about a year ago which is worse when at sleep and when she is gripping. She reports she is having tingling going up to her elbow as well a couple of times a day more when she is laying down. States she is not sleeping very well.   States she has a history of R carpal tunnel syndrome in  after a pregnancy. States she is having diffculty opening up things. States she hasn't been doing a lot of cooking because of the pain in her R hand. She states she is keeping her hand over her head, because the doctor told her to keep it elevated. She is wearing a wrist cock up brace on the R at all times. 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 She has a history of trigger thumb with release done on the R in . History of R carpal tunnel and recently left thumb flexor injection. History of L carpal tunnel release (still wears a wrist cock up brace on the L at night) and surgery on radial wrist because \"my wrist kept locking up. \"    Ms. White  has a past medical history of Chronic groin pain, Environmental allergies, Incisional pain, and Sebaceous cyst.  Ms. Salvador Escobedo  has a past surgical history that includes hx breast reduction (Bilateral); hx  section; hx myomectomy; hx tonsillectomy; hx dilation and curettage; hx hysterectomy (12); hx bunionectomy (Bilateral); EXPLORATION AND REVISION OF PFANNENSTIEL INCISION (Left, 2014); LAPAROSCOPY GYN DIAGNOSTIC (N/A, 2014); EXCISION OF SEBACEOUS CYST LEFT BUTTOCK (Left, 2014); RIGHT THUMB TRIGGER RELEASE (Right, 12/10/2013); HAND CARPAL TUNNEL RELEASE LEFT (Left, 12/10/2013); DEQUERVAINS RELEASE LEFT (Left, 12/10/2013); and HYSTERECTOMY ABDOMINAL TOTAL WITH SALPINGO OOPHORECTOMY (Bilateral, 2012). Social History/Living Environment:    Denies smoking. Denies any   Prior Level of Function/Work/Activity:  Lives with son in apartment. 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 \"  Does not   Dominant Side:         RIGHT  Previous Treatment Approaches:          No therapy.   Current Medications:    Current Outpatient Medications: per intake form.   fexofenadine (ALLEGRA) 180 mg tablet, Take 180 mg by mouth daily. Date Last Reviewed:  2019    Number of medical conditions (excluding presenting problem) that affect the Plan of Care: Expanded review of therapy/medical records (1-2):  MODERATE COMPLEXITY   ASSESSMENT OF OCCUPATIONAL PERFORMANCE:   RANGE OF MOTION:     · AROM:   R:  Wrist extension: 70° versus 62°  Wrist flexion: 62°  versus 50°  Finger tips to palm versus ~1-2 cm from palm with straight fist.   Shoulder ER 80-90° shoulder abducted to 90° (complains of pain with this). L:   Wrist extension: 69°  Wrist flexion: 65°  Shoulder ER 80-90° shoulder abducted to 90°  STRENGTH:     R:   Schafer abduction: 5 versus 4+/5  Opposition: 5 versus 4+/5  Thumb adduction: 5 versus  4/5  FPL: 5 versus 4+/5  Thumb MP flexion: 4+/5  Digit adduction: IF/SF:5 versus  4-/5; RF: 4/5; LF: 4+/5  Digit abduction: IF/SF: 5 versus 4+/5; RF: 4-/5  Shoulder flexion: 4+/5  Shoulder ER: 4/5  Elbow flexion: 5/5  Elbow extension: 5/5  L:   Schafer abduction: 4/5  Opposition: 4/5  Thumb adduction: 4/5  FPL: 4+/5  Thumb MP flexion: 4+/5  Digit adduction: IF/SF: 4/5; RF: 4+/5; LF: 4+/5  Digit abduction: IF/SF: 4+/5; RF: 4+/5; LF: 4+/5  Shoulder flexion: 4+/5  Shoulder ER: 4+/5  Elbow flexion: 5/5  Elbow extension: 5/5   Strength (Dynamometer) 18    LUE 1st run lbs 2nd run lbs; 3rd run lbs; 4th run 5th rung: 15 lbs     RUE 1st run lbs 2nd run lbs; 3rd run lbs; 4th run lbs 5th run lbs 30 lbs 2nd rung. SENSATION:    Brenton-Miri Monofilament (D=Dorsally; V=Volarly):  2.83: D: R: Senses over thumb P1/P2. Unable to sense anywhere else. V: Senses 75% versus 25% of the time over RF/LF P1-3. Able to sense L/R hands elsewhere.; L: 100% excluding thumb P1/P2;   3.61: D: R: Senses RF P1-3 75% versus 25% of the time. Senses LF P1-3 100% versus 30-50% of the time. Able to sense elsewhere R/L hands. V: R/L 100%. 4.31: 100%  4.56:100%  6.65: 100%  EDEMA:  No significant swelling noted. OBSERVATION/PALPATION:  Tenderness (most tender radial proximal portion) over volar scar/incision along proximal thenar crease. Reports tingling RF/LF at times during scar massage. SPECIAL TESTS:  Jose Pugh: -; Tinel's: over carpal tunnel: -; over cubital tunnel: +; Mode pick-up R/L: 11/10 seconds; eyes occluded 20 seconds. Upper limb tension test: RUE positive for median nerve     Physical Skills Involved:  1. Range of Motion  2. Strength  3. Activity Tolerance  4. Sensation  5. Skin Integrity Cognitive Skills Affected (resulting in the inability to perform in a timely and safe manner):  1. None noted Psychosocial Skills Affected:  1. Habits/Routines  2. Environmental Adaptation  3. Social Interaction  4. Social Roles   Number of elements that affect the Plan of Care: 1-3:  LOW COMPLEXITY   CLINICAL DECISION MAKING:   Outcome Measure: Tool Used: Disabilities of the Arm, Shoulder and Hand (DASH) Questionnaire - Quick Version  Score:  Initial: 39/55 (ommitted #6) Most Recent: 20/55 (Date: 1/2/19)   Interpretation of Score: The DASH is designed to measure the activities of daily living in person's with upper extremity dysfunction or pain. Each section is scored on a 1-5 scale, 5 representing the greatest disability. The scores of each section are added together for a total score of 55. Medical Necessity:   · Patient demonstrates good rehab potential due to higher previous functional level. Reason for Services/Other Comments:  · Patient continues to require skilled intervention due to decreased independence with ADL, instrumental ADL and work tasks. Clinical Decision-Making Assessment:  Shiv White required none to minimal verbal, visual, physical or environmental cues to carry out assessment tasks.    Clinical Decision-Making: LOW COMPLEXITY   TREATMENT:   (In addition to Assessment/Re-Assessment sessions the following treatments were rendered)  Pre-treatment Symptoms/Complaints:  Numbness/tingling R RF/LF. Reports no tingling going up her elbow over the weekend. Pain: Initial:   Pain Intensity 1: 4(none in hand)  Pain Location 1: Shoulder  Pain Orientation 1: Right  Post Session:  2/10     MODALITIES: (0-5 minutes): *  Hot Pack Therapy in order to provide analgesia and reduce stiffness R shoulder. Carrolyn Grange MODALITIES: (5 minutes): *  ParaffinTherapy in order to provide analgesia and reduce stiffness R hand/wrist and to improve tissue extensibility in preparation for therapeutic exercise/activity. Therapeutic Exercise: (  39 minutes):  Exercises per grid below to improve mobility, strength, coordination and dynamic movement of hand - right and wrist - right to improve functional reaching and grasping/releasing. Required moderate visual, verbal, manual and tactile cues to promote proper body alignment, promote proper body posture and promote proper body mechanics. Progressed resistance, range, repetitions and complexity of movement as indicated. Date:  12/12 Date:  12/14 Date:  12/17 Date:  12/20 Date:  12/26 Date:  12/31 Date:  1/2/19   Activity/Exercise Parameters Parameters Parameters Parameters Parameters Parameters Parameters   Tendon glides 3 sets 5-10 reps (pain in R shoulder with straight fist) 1. 3 sets 5-10 reps (mild pain in R shoulder with straight fist). 2. Composite fist with wrist extension 5 reps. 1-2 sets 5-10 reps. 1-2 sets 5-10 reps. 1-2 sets 3-5 reps. Long finger extensor stretch  1. Composite flexion assist from therapist 2-3 sets held 2-3 minutes each. Composite flexion assist from therapist 1-2 sets held 2-3 minutes each. Composite flexion assist from therapist 1-2 sets held 2-3 minutes each. Composite flexion assist from therapist 1-2 sets held 2-3 minutes each. Composite flexion with wrist flexion held 2-3 minutes each.  Composite flexion with wrist flexion held 2-3 minutes each. Long finger extensor stretch  1. Against base of ramp 2-3 sets held 30-60 seconds. 2. Prayer  Stretch 2-3 sets held 15-30 seconds. 1. Against base of ramp 2-3 sets held 30-60 seconds. 2. Prayer  Stretch 1-2 sets held 15-30 seconds. Against base of ramp 2-3 sets held 2-3 minutes. Against base of ramp 2-3 sets held 2-3 minutes. 1. Long finger flexors with elbow extension 2-3 sets held 30-60 seconds. 2. Prayer  Stretch 1-2 sets held 15-30 seconds. 1. Long finger flexors with elbow extension 2-3 sets held 30-60 seconds. 2. Table Stretch 1-2 sets held 15-30 seconds. Individual finger extension  In full wrist extension off of back of ramp 4-5 reps. In full wrist extension off of back of ramp 4-5 reps. Individual finger extension off of back of ramp 4-5 reps each finger (RF required AAROM - holding isometrically 2-3 reps) Individual finger extension off of back of ramp 4-5 reps each finger     Composite finger extension      Tan theraputty 10 reps. Intrinsic strengthening  1. Digit adduction tan theraputty 2 sets 5-10 reps. 2. Digit abduction rubberband resistance 5-10 reps. 1. Digit adduction tan theraputty 1-2 sets 5-10 reps. 2. Digit abduction rubberband resistance 1-2 sets 5-10 reps RF adduction 5-10 reps AROM RF adduction 5-10 reps AROM 1.RF adduction 5-10 reps AROM. 2. Digit adduction tan theraputty 1-2 sets 5-10 reps. 3. Digit abduction rubberband resistance 1-2 sets 5-10 reps   1.RF adduction 5-10 reps AROM. 2. Digit adduction tan theraputty 1-2 sets 5-10 reps. Hook fisting     Tan theraputty 5 reps Tan theraputty 10 reps 1. Tan theraputty 5-10 reps. 2. Orange theraputty 3-5 reps. Wrist extension/flexion    10-15 reps 1 lb dumbbell each. 10-15 reps 2 lb dumbbell each. 1.  and pull 2 sets 10-15 reps  orange/tan theraputty. 10-15 reps 2 lb dumbbell each. Wrist radial/ulnar deviation    10-15 reps 1 lb dumbbell 10-15 reps 2 lb dumbbell each.  and pull 5-10 reps orange/tan theraputty radial deviation. 10-15 reps 2 lb dumbbell each. Thumb cordova abduction   1. 5-10 reps AROM. 2. 5-10 reps with rubber band. Shoulder external rotation   Shoulders adducted A: 4-5 reps yellow theraband (reported some dull pain in R shoulder with this). B: 5-10 reps AROM. 1st rib mobization     Utilizing belt hold 15-30 seconds then add cervical rotation L and flexion for gentle stretch held 15-30 seconds. Utilizing belt hold 15-30 seconds then add cervical rotation L and flexion for gentle stretch held 15-30 seconds. Utilizing belt hold 15-30 seconds then add cervical rotation L and flexion for gentle stretch held 15-30 seconds. Median nerve glides   1. Shoulder adducted 3 sets 3-4 reps. 2. Shoulder abducted to 90° A: elbow extended 1-2 sets 4-5 reps. B: Elbow flex/extend then laterally flex neck L 1-2 sets 2-3 reps. C:  Also completed upper limb tension for for median nerve. 1. Shoulder adducted 3 sets 3-4 reps. 2. Shoulder abducted to 90° A: elbow extended 1-2 sets 4-5 reps. 1. Shoulder adducted 3 sets 3-4 reps. 2. Shoulder abducted to 90° A: elbow extended 1-2 sets 4-5 reps held 15-30 seconds each. Therapeutic Activity: (    1 minute): Therapeutic activities including Carry objects and manipulate objects to improve mobility and coordination. Required minimal   to promote coordination of right, upper extremity(s). Patient folded a pillow case several times to simulate work tasks without significant difficulty. Manual Therapy: (10 minutes): Soft tissue mobilization was utilized and necessary because of the patient's restricted joint motion and restricted motion of soft tissue. Gentle longitudinal and horizontal scar massage completed with scar massage tool (rubber tip). Patient reported no tingling in LF/RF. Home program: As above.   Treatment/Session Assessment:  Tim White demonstrated continued improvement with R hand/wrist ROM, strength and function. Believes her R shoulder pain is related to her elbow. Plan to discharge Miss Lorna Amaro with her home program at this time. . . · Response to Treatment:  Patients tolerated treatment Well with no medical complications. Upon completion of treatment, skin condition was intact/without erythema. .  · Compliance with Program/Exercises: compliant today. · Recommendations/Intent for next treatment session: \"Next visit will focus on advancements to more challenging activities\".   Total Treatment Duration:  OT Patient Time In/Time Out  Time In: 1300  Time Out: 0771 MARK Rasmussen, OTR/L

## 2020-10-21 ENCOUNTER — TRANSCRIBE ORDER (OUTPATIENT)
Dept: SCHEDULING | Age: 55
End: 2020-10-21

## 2020-10-21 DIAGNOSIS — Z12.31 VISIT FOR SCREENING MAMMOGRAM: Primary | ICD-10-CM

## 2020-10-26 ENCOUNTER — HOSPITAL ENCOUNTER (OUTPATIENT)
Dept: MAMMOGRAPHY | Age: 55
Discharge: HOME | End: 2020-10-26

## 2020-10-26 DIAGNOSIS — Z12.31 VISIT FOR SCREENING MAMMOGRAM: ICD-10-CM

## 2020-10-26 PROCEDURE — 77067 SCR MAMMO BI INCL CAD: CPT

## 2024-04-22 RX ORDER — ESTRADIOL 0.1 MG/G
CREAM VAGINAL
Qty: 42.5 G | OUTPATIENT
Start: 2024-04-22

## 2024-04-29 DIAGNOSIS — N95.2 VAGINAL ATROPHY: Primary | ICD-10-CM

## 2024-04-29 RX ORDER — ESTRADIOL 0.1 MG/G
1 CREAM VAGINAL
Qty: 42.5 G | Refills: 0 | Status: SHIPPED | OUTPATIENT
Start: 2024-04-29

## 2024-05-08 NOTE — PROGRESS NOTES
HPI  Julianna Cruz is a 58 y.o. female seen for annual GYN exam.    Past Medical History, Past Surgical History, Family history, Social History, and Medications were all reviewed with the patient today and updated as necessary.     Current Outpatient Medications   Medication Sig    fluticasone (FLONASE) 50 MCG/ACT nasal spray 2 sprays by Nasal route daily    estradiol (ESTRACE) 0.1 MG/GM vaginal cream Place 1 g vaginally Twice a Week 1 gram intravaginally 2 x per week    Cetirizine HCl 10 MG CAPS Take by mouth    ergocalciferol (ERGOCALCIFEROL) 1.25 MG (41844 UT) capsule Take 1 capsule by mouth Twice a Week    fexofenadine (ALLEGRA) 180 MG tablet Take 1 tablet by mouth daily     No current facility-administered medications for this visit.     No Known Allergies  Past Medical History:   Diagnosis Date    Chronic groin pain 3/22/2016    Environmental allergies     High cholesterol     Incisional pain     left lower abd    Osteoporosis     Sebaceous cyst     left buttock     Past Surgical History:   Procedure Laterality Date    BREAST REDUCTION SURGERY Bilateral     BUNIONECTOMY Bilateral      SECTION      DILATION AND CURETTAGE OF UTERUS      MYOMECTOMY      MIRNA AND BSO (CERVIX REMOVED)  2012    with ovaries removed    TONSILLECTOMY       Family History   Problem Relation Age of Onset    Breast Cancer Neg Hx     Heart Disease Father     Cancer Mother         stomach      Social History     Tobacco Use    Smoking status: Never    Smokeless tobacco: Never   Substance Use Topics    Alcohol use: No       Social History     Substance and Sexual Activity   Sexual Activity Not on file     OB History    Para Term  AB Living   2 0 0 0 1 0   SAB IAB Ectopic Molar Multiple Live Births   0 0 0 0 0 0       Health Maintenance  Mammogram:  Innervision per pt   Colonoscopy:Oct 2023  Repeat 5 years   Bone Density:  Pap smear:Hysterectomy BSO       Review of Systems  General: Not Present-

## 2024-05-09 ENCOUNTER — OFFICE VISIT (OUTPATIENT)
Dept: OBGYN CLINIC | Age: 59
End: 2024-05-09
Payer: COMMERCIAL

## 2024-05-09 VITALS
HEIGHT: 57 IN | BODY MASS INDEX: 24.16 KG/M2 | SYSTOLIC BLOOD PRESSURE: 110 MMHG | WEIGHT: 112 LBS | DIASTOLIC BLOOD PRESSURE: 70 MMHG

## 2024-05-09 DIAGNOSIS — N95.2 VAGINAL ATROPHY: ICD-10-CM

## 2024-05-09 DIAGNOSIS — Z12.31 VISIT FOR SCREENING MAMMOGRAM: ICD-10-CM

## 2024-05-09 DIAGNOSIS — Z01.419 WELL WOMAN EXAM: Primary | ICD-10-CM

## 2024-05-09 PROCEDURE — 99459 PELVIC EXAMINATION: CPT | Performed by: OBSTETRICS & GYNECOLOGY

## 2024-05-09 PROCEDURE — 99396 PREV VISIT EST AGE 40-64: CPT | Performed by: OBSTETRICS & GYNECOLOGY

## 2024-05-09 RX ORDER — FLUTICASONE PROPIONATE 50 MCG
2 SPRAY, SUSPENSION (ML) NASAL DAILY
COMMUNITY

## 2024-05-09 RX ORDER — ESTRADIOL 0.1 MG/G
1 CREAM VAGINAL
Qty: 42.5 G | Refills: 5 | Status: SHIPPED | OUTPATIENT
Start: 2024-05-09